# Patient Record
Sex: FEMALE | Race: BLACK OR AFRICAN AMERICAN | NOT HISPANIC OR LATINO | URBAN - METROPOLITAN AREA
[De-identification: names, ages, dates, MRNs, and addresses within clinical notes are randomized per-mention and may not be internally consistent; named-entity substitution may affect disease eponyms.]

---

## 2017-08-04 ENCOUNTER — INPATIENT (INPATIENT)
Facility: HOSPITAL | Age: 82
LOS: 11 days | Discharge: NURSING, REHAB OR LONG-TERM CARE | DRG: 641 | End: 2017-08-16
Attending: INTERNAL MEDICINE | Admitting: HOSPITALIST
Payer: MEDICARE

## 2017-08-04 VITALS
WEIGHT: 130.07 LBS | OXYGEN SATURATION: 97 % | SYSTOLIC BLOOD PRESSURE: 210 MMHG | HEART RATE: 92 BPM | TEMPERATURE: 98 F | RESPIRATION RATE: 20 BRPM | DIASTOLIC BLOOD PRESSURE: 98 MMHG

## 2017-08-04 PROBLEM — Z00.00 ENCOUNTER FOR PREVENTIVE HEALTH EXAMINATION: Status: ACTIVE | Noted: 2017-08-04

## 2017-08-04 LAB
ANION GAP SERPL CALC-SCNC: 19 MMOL/L — HIGH (ref 5–17)
APPEARANCE UR: CLEAR — SIGNIFICANT CHANGE UP
APTT BLD: 28.9 SEC — SIGNIFICANT CHANGE UP (ref 27.5–37.4)
BACTERIA # UR AUTO: ABNORMAL
BILIRUB UR-MCNC: ABNORMAL
BUN SERPL-MCNC: 19 MG/DL — SIGNIFICANT CHANGE UP (ref 8–20)
CALCIUM SERPL-MCNC: 10.6 MG/DL — HIGH (ref 8.6–10.2)
CHLORIDE SERPL-SCNC: 99 MMOL/L — SIGNIFICANT CHANGE UP (ref 98–107)
CK SERPL-CCNC: 181 U/L — HIGH (ref 25–170)
CO2 SERPL-SCNC: 26 MMOL/L — SIGNIFICANT CHANGE UP (ref 22–29)
COLOR SPEC: ABNORMAL
COMMENT - URINE: SIGNIFICANT CHANGE UP
CREAT SERPL-MCNC: 0.74 MG/DL — SIGNIFICANT CHANGE UP (ref 0.5–1.3)
DIFF PNL FLD: ABNORMAL
EPI CELLS # UR: SIGNIFICANT CHANGE UP
GLUCOSE SERPL-MCNC: 171 MG/DL — HIGH (ref 70–115)
GLUCOSE UR QL: NEGATIVE MG/DL — SIGNIFICANT CHANGE UP
HCT VFR BLD CALC: 52.1 % — HIGH (ref 37–47)
HGB BLD-MCNC: 17.3 G/DL — HIGH (ref 12–16)
HYALINE CASTS # UR AUTO: 3 /LPF — SIGNIFICANT CHANGE UP (ref 0–7)
INR BLD: 1.05 RATIO — SIGNIFICANT CHANGE UP (ref 0.88–1.16)
KETONES UR-MCNC: ABNORMAL
LEUKOCYTE ESTERASE UR-ACNC: ABNORMAL
MCHC RBC-ENTMCNC: 31.3 PG — HIGH (ref 27–31)
MCHC RBC-ENTMCNC: 33.2 G/DL — SIGNIFICANT CHANGE UP (ref 32–36)
MCV RBC AUTO: 94.4 FL — SIGNIFICANT CHANGE UP (ref 81–99)
NITRITE UR-MCNC: NEGATIVE — SIGNIFICANT CHANGE UP
PH UR: 6 — SIGNIFICANT CHANGE UP (ref 5–8)
PLATELET # BLD AUTO: 119 K/UL — LOW (ref 150–400)
POTASSIUM SERPL-MCNC: 3.4 MMOL/L — LOW (ref 3.5–5.3)
POTASSIUM SERPL-SCNC: 3.4 MMOL/L — LOW (ref 3.5–5.3)
PROT UR-MCNC: 500 MG/DL
PROTHROM AB SERPL-ACNC: 11.6 SEC — SIGNIFICANT CHANGE UP (ref 9.8–12.7)
RBC # BLD: 5.52 M/UL — HIGH (ref 4.4–5.2)
RBC # FLD: 14.9 % — SIGNIFICANT CHANGE UP (ref 11–15.6)
RBC CASTS # UR COMP ASSIST: ABNORMAL /HPF (ref 0–4)
SODIUM SERPL-SCNC: 144 MMOL/L — SIGNIFICANT CHANGE UP (ref 135–145)
SP GR SPEC: 1.02 — SIGNIFICANT CHANGE UP (ref 1.01–1.02)
TROPONIN T SERPL-MCNC: 0.04 NG/ML — SIGNIFICANT CHANGE UP (ref 0–0.06)
UROBILINOGEN FLD QL: 8 MG/DL
WBC # BLD: 4.8 K/UL — SIGNIFICANT CHANGE UP (ref 4.8–10.8)
WBC # FLD AUTO: 4.8 K/UL — SIGNIFICANT CHANGE UP (ref 4.8–10.8)
WBC UR QL: SIGNIFICANT CHANGE UP

## 2017-08-04 PROCEDURE — 99223 1ST HOSP IP/OBS HIGH 75: CPT

## 2017-08-04 PROCEDURE — 99285 EMERGENCY DEPT VISIT HI MDM: CPT

## 2017-08-04 PROCEDURE — 70450 CT HEAD/BRAIN W/O DYE: CPT | Mod: 26

## 2017-08-04 RX ORDER — SODIUM CHLORIDE 9 MG/ML
3 INJECTION INTRAMUSCULAR; INTRAVENOUS; SUBCUTANEOUS ONCE
Qty: 0 | Refills: 0 | Status: COMPLETED | OUTPATIENT
Start: 2017-08-04 | End: 2017-08-04

## 2017-08-04 RX ADMIN — SODIUM CHLORIDE 3 MILLILITER(S): 9 INJECTION INTRAMUSCULAR; INTRAVENOUS; SUBCUTANEOUS at 15:26

## 2017-08-04 NOTE — H&P ADULT - HISTORY OF PRESENT ILLNESS
H&P obtained from chart as pt refused interview and physical examination. She is currently alone. Per chart she is a 89 y/o female who was sent in by EMS as a friend indicated that she did not leave her room since Tuesday. Pt rents a room in a house and was not seen walking for one week. On arrival pt c/o severe pain worsen with movement, location unknown. I’m unable to obtain more information at this time. Currently, Pt appears clinically dehydrated, frail, and is resting comfortably without distress.

## 2017-08-04 NOTE — ED ADULT NURSE REASSESSMENT NOTE - NS ED NURSE REASSESS COMMENT FT1
Report received from off going RN, charting as noted. Patient alert to self & place. Stated is in a "little" pain secondary to arthritis. Respirations even & unlabored, denies any numbness or tingling. Cardiac monitor in place, NSR. IV site C/D/I, patent, negative s/s phlebitis or infiltration. Will continue to monitor. Patient found to have significant amount of cash and phone on hand. Placed in security envelope and vouchered.

## 2017-08-04 NOTE — H&P ADULT - ASSESSMENT
89 y/o poor historian female, currently being admitted with failure to thrive and mild rhabdomyolysis secondary to dehydration. Please re-evaluate and complete physical examination if pt allows. Unable to complete medication reconciliation as well. Significant proteinuria noted – unclear etiology  Admit to medical unit; NPO until bedside dysphagia screen is completed; IV hydration; Fall / aspiration precautions, bedrest; f/u repeat cbc, cmp, and repeat UA; started Rocephin; DVT prophylaxis.   please contact - primary care physician for patient history.

## 2017-08-04 NOTE — ED PROVIDER NOTE - CONSTITUTIONAL, MLM
normal... ILL appearing, POORLY nourished, awake, alert, oriented to person, place, time/situation and in MILD distress.

## 2017-08-04 NOTE — H&P ADULT - NSHPPHYSICALEXAM_GEN_ALL_CORE
Vital Signs Last 24 Hrs  T(C): 36.4 (04 Aug 2017 19:40), Max: 36.4 (04 Aug 2017 14:35)  T(F): 97.6 (04 Aug 2017 19:40), Max: 97.6 (04 Aug 2017 14:35)  HR: 91 (04 Aug 2017 19:40) (89 - 92)  BP: 122/86 (04 Aug 2017 19:40) (122/86 - 210/98)  RR: 16 (04 Aug 2017 19:40) (16 - 20)  SpO2: 99% (04 Aug 2017 19:40) (97% - 99%)    unable to examine as patient refused.

## 2017-08-04 NOTE — ED ADULT NURSE NOTE - OBJECTIVE STATEMENT
pt presents to ED for evaluation of increased generalized weakness. pt poor historian, pt poorly kempt. pt awake and alert, x 1. pt unaware of time and place, breathing si even and unlabored. afebrile. pt c/o chronic joint pain with movement, abd soft nt nd +bs denies n/v./d, skin w.d.i, pt placed on cardiac monitor NSR ntoed with rate of 98. will continue to montis and reassess

## 2017-08-04 NOTE — ED PROVIDER NOTE - OBJECTIVE STATEMENT
PATIENT SENT TO ER FROM HOME AFTER NOT WALKING FOR A WEEK AS PER [facility]. PT RENTS A ROOM IN A  HOUSE. THE [facility] CALLED 911 B/C THE PATIENT HAS OT BEEN OOB IN ONE WEEK. PT C/O SEVERE PAIN FROM HER ARTHRITIS. PAIN WORSE WITHMOVEMENT. DENIES FEVER, COUGH, BOWEL OR URINARY ISSUES.

## 2017-08-04 NOTE — ED PROVIDER NOTE - MEDICAL DECISION MAKING DETAILS
PT WITH WEAKNESS AND SEVERE ARTHRITIS. IF UNABLE TO AMBULATE WILL BE UNABLE TO SEND HOME. LAB EVAUATION NOT SPECIFIC OTHER THAN SOME RBCS IN THE URINE BUT NOT ENOUGH TO EXPLAIN CONTRACTURES AND WEAKNESS. LIKELY SEVERELY DECONDITIONED PT WITH WEAKNESS AND SEVERE ARTHRITIS. IF UNABLE TO AMBULATE WILL BE UNABLE TO SEND HOME. LAB EVAUATION NOT SPECIFIC OTHER THAN SOME RBCS IN THE URINE BUT NOT ENOUGH TO EXPLAIN CONTRACTURES AND WEAKNESS. LIKELY SEVERELY DECONDITIONED  UNABLE TO STRAIGHTEN LEGS. LIKELY GREATER THAN A WEEK IN BED. CANNOT WALK. CANNOT GO HOME. NO FAMILY HERE. ONLY [facility] CAME AFTER PT ARRIVED TO ER. ADMIT

## 2017-08-04 NOTE — ED ADULT NURSE REASSESSMENT NOTE - NS ED NURSE REASSESS COMMENT FT1
pt resting comfortably in cart. breathing si even and unlabored. pt awaiting dispo. dispo pending ct scan resutls. will continue to monitor and reassess.

## 2017-08-04 NOTE — ED ADULT TRIAGE NOTE - CHIEF COMPLAINT QUOTE
BIBA, patient is awake and disoriented, sent from home, as per EMS, a rented room, for eval of weakness, patient was last seen near baseline on Tuesday,  falls, patient is a poor historian, no family or friend present on arrival

## 2017-08-05 DIAGNOSIS — R62.7 ADULT FAILURE TO THRIVE: ICD-10-CM

## 2017-08-05 DIAGNOSIS — I10 ESSENTIAL (PRIMARY) HYPERTENSION: ICD-10-CM

## 2017-08-05 DIAGNOSIS — R53.1 WEAKNESS: ICD-10-CM

## 2017-08-05 DIAGNOSIS — E86.0 DEHYDRATION: ICD-10-CM

## 2017-08-05 LAB
ALBUMIN SERPL ELPH-MCNC: 3.7 G/DL — SIGNIFICANT CHANGE UP (ref 3.3–5.2)
ALP SERPL-CCNC: 79 U/L — SIGNIFICANT CHANGE UP (ref 40–120)
ALT FLD-CCNC: 10 U/L — SIGNIFICANT CHANGE UP
ANION GAP SERPL CALC-SCNC: 18 MMOL/L — HIGH (ref 5–17)
AST SERPL-CCNC: 19 U/L — SIGNIFICANT CHANGE UP
BASOPHILS # BLD AUTO: 0 K/UL — SIGNIFICANT CHANGE UP (ref 0–0.2)
BASOPHILS NFR BLD AUTO: 0.2 % — SIGNIFICANT CHANGE UP (ref 0–2)
BILIRUB SERPL-MCNC: 1.3 MG/DL — SIGNIFICANT CHANGE UP (ref 0.4–2)
BUN SERPL-MCNC: 21 MG/DL — HIGH (ref 8–20)
CALCIUM SERPL-MCNC: 10.1 MG/DL — SIGNIFICANT CHANGE UP (ref 8.6–10.2)
CHLORIDE SERPL-SCNC: 105 MMOL/L — SIGNIFICANT CHANGE UP (ref 98–107)
CO2 SERPL-SCNC: 24 MMOL/L — SIGNIFICANT CHANGE UP (ref 22–29)
CREAT SERPL-MCNC: 0.78 MG/DL — SIGNIFICANT CHANGE UP (ref 0.5–1.3)
GLUCOSE SERPL-MCNC: 137 MG/DL — HIGH (ref 70–115)
HCT VFR BLD CALC: 48 % — HIGH (ref 37–47)
HGB BLD-MCNC: 16.3 G/DL — HIGH (ref 12–16)
LYMPHOCYTES # BLD AUTO: 1.4 K/UL — SIGNIFICANT CHANGE UP (ref 1–4.8)
LYMPHOCYTES # BLD AUTO: 24 % — SIGNIFICANT CHANGE UP (ref 20–55)
MCHC RBC-ENTMCNC: 32 PG — HIGH (ref 27–31)
MCHC RBC-ENTMCNC: 34 G/DL — SIGNIFICANT CHANGE UP (ref 32–36)
MCV RBC AUTO: 94.3 FL — SIGNIFICANT CHANGE UP (ref 81–99)
MONOCYTES # BLD AUTO: 0.6 K/UL — SIGNIFICANT CHANGE UP (ref 0–0.8)
MONOCYTES NFR BLD AUTO: 9.6 % — SIGNIFICANT CHANGE UP (ref 3–10)
NEUTROPHILS # BLD AUTO: 4 K/UL — SIGNIFICANT CHANGE UP (ref 1.8–8)
NEUTROPHILS NFR BLD AUTO: 65.9 % — SIGNIFICANT CHANGE UP (ref 37–73)
PLATELET # BLD AUTO: 104 K/UL — LOW (ref 150–400)
POTASSIUM SERPL-MCNC: 3.3 MMOL/L — LOW (ref 3.5–5.3)
POTASSIUM SERPL-SCNC: 3.3 MMOL/L — LOW (ref 3.5–5.3)
PROT SERPL-MCNC: 7 G/DL — SIGNIFICANT CHANGE UP (ref 6.6–8.7)
RBC # BLD: 5.09 M/UL — SIGNIFICANT CHANGE UP (ref 4.4–5.2)
RBC # FLD: 15 % — SIGNIFICANT CHANGE UP (ref 11–15.6)
SODIUM SERPL-SCNC: 147 MMOL/L — HIGH (ref 135–145)
WBC # BLD: 6 K/UL — SIGNIFICANT CHANGE UP (ref 4.8–10.8)
WBC # FLD AUTO: 6 K/UL — SIGNIFICANT CHANGE UP (ref 4.8–10.8)

## 2017-08-05 PROCEDURE — 99233 SBSQ HOSP IP/OBS HIGH 50: CPT

## 2017-08-05 RX ORDER — CEFTRIAXONE 500 MG/1
1 INJECTION, POWDER, FOR SOLUTION INTRAMUSCULAR; INTRAVENOUS ONCE
Qty: 0 | Refills: 0 | Status: COMPLETED | OUTPATIENT
Start: 2017-08-05 | End: 2017-08-05

## 2017-08-05 RX ORDER — HYDRALAZINE HCL 50 MG
10 TABLET ORAL ONCE
Qty: 0 | Refills: 0 | Status: COMPLETED | OUTPATIENT
Start: 2017-08-05 | End: 2017-08-05

## 2017-08-05 RX ORDER — HYDRALAZINE HCL 50 MG
10 TABLET ORAL
Qty: 0 | Refills: 0 | Status: DISCONTINUED | OUTPATIENT
Start: 2017-08-05 | End: 2017-08-16

## 2017-08-05 RX ORDER — POTASSIUM CHLORIDE 20 MEQ
40 PACKET (EA) ORAL EVERY 4 HOURS
Qty: 0 | Refills: 0 | Status: DISCONTINUED | OUTPATIENT
Start: 2017-08-05 | End: 2017-08-05

## 2017-08-05 RX ORDER — POTASSIUM CHLORIDE 20 MEQ
10 PACKET (EA) ORAL
Qty: 0 | Refills: 0 | Status: COMPLETED | OUTPATIENT
Start: 2017-08-05 | End: 2017-08-05

## 2017-08-05 RX ORDER — SODIUM CHLORIDE 9 MG/ML
1000 INJECTION INTRAMUSCULAR; INTRAVENOUS; SUBCUTANEOUS ONCE
Qty: 0 | Refills: 0 | Status: COMPLETED | OUTPATIENT
Start: 2017-08-05 | End: 2017-08-05

## 2017-08-05 RX ORDER — SODIUM CHLORIDE 9 MG/ML
1000 INJECTION INTRAMUSCULAR; INTRAVENOUS; SUBCUTANEOUS
Qty: 0 | Refills: 0 | Status: DISCONTINUED | OUTPATIENT
Start: 2017-08-05 | End: 2017-08-05

## 2017-08-05 RX ORDER — SODIUM CHLORIDE 9 MG/ML
1000 INJECTION, SOLUTION INTRAVENOUS
Qty: 0 | Refills: 0 | Status: DISCONTINUED | OUTPATIENT
Start: 2017-08-05 | End: 2017-08-06

## 2017-08-05 RX ORDER — CEFTRIAXONE 500 MG/1
1 INJECTION, POWDER, FOR SOLUTION INTRAMUSCULAR; INTRAVENOUS EVERY 24 HOURS
Qty: 0 | Refills: 0 | Status: DISCONTINUED | OUTPATIENT
Start: 2017-08-06 | End: 2017-08-08

## 2017-08-05 RX ORDER — ENOXAPARIN SODIUM 100 MG/ML
30 INJECTION SUBCUTANEOUS EVERY 24 HOURS
Qty: 0 | Refills: 0 | Status: DISCONTINUED | OUTPATIENT
Start: 2017-08-05 | End: 2017-08-16

## 2017-08-05 RX ORDER — CEFTRIAXONE 500 MG/1
INJECTION, POWDER, FOR SOLUTION INTRAMUSCULAR; INTRAVENOUS
Qty: 0 | Refills: 0 | Status: DISCONTINUED | OUTPATIENT
Start: 2017-08-05 | End: 2017-08-08

## 2017-08-05 RX ORDER — POTASSIUM CHLORIDE 20 MEQ
10 PACKET (EA) ORAL ONCE
Qty: 0 | Refills: 0 | Status: COMPLETED | OUTPATIENT
Start: 2017-08-05 | End: 2017-08-05

## 2017-08-05 RX ORDER — ACETAMINOPHEN 500 MG
1000 TABLET ORAL ONCE
Qty: 0 | Refills: 0 | Status: COMPLETED | OUTPATIENT
Start: 2017-08-05 | End: 2017-08-05

## 2017-08-05 RX ADMIN — SODIUM CHLORIDE 80 MILLILITER(S): 9 INJECTION INTRAMUSCULAR; INTRAVENOUS; SUBCUTANEOUS at 05:14

## 2017-08-05 RX ADMIN — Medication 100 MILLIEQUIVALENT(S): at 13:56

## 2017-08-05 RX ADMIN — CEFTRIAXONE 100 GRAM(S): 500 INJECTION, POWDER, FOR SOLUTION INTRAMUSCULAR; INTRAVENOUS at 00:59

## 2017-08-05 RX ADMIN — Medication 40 MILLIEQUIVALENT(S): at 10:44

## 2017-08-05 RX ADMIN — Medication 100 MILLIEQUIVALENT(S): at 12:04

## 2017-08-05 RX ADMIN — Medication 100 MILLIEQUIVALENT(S): at 00:59

## 2017-08-05 RX ADMIN — Medication 10 MILLIGRAM(S): at 03:55

## 2017-08-05 RX ADMIN — SODIUM CHLORIDE 100 MILLILITER(S): 9 INJECTION, SOLUTION INTRAVENOUS at 10:43

## 2017-08-05 RX ADMIN — Medication 10 MILLIGRAM(S): at 18:57

## 2017-08-05 RX ADMIN — Medication 1000 MILLIGRAM(S): at 23:43

## 2017-08-05 RX ADMIN — Medication 400 MILLIGRAM(S): at 23:13

## 2017-08-05 RX ADMIN — ENOXAPARIN SODIUM 30 MILLIGRAM(S): 100 INJECTION SUBCUTANEOUS at 10:44

## 2017-08-05 RX ADMIN — Medication 100 MILLIEQUIVALENT(S): at 15:30

## 2017-08-05 RX ADMIN — SODIUM CHLORIDE 250 MILLILITER(S): 9 INJECTION INTRAMUSCULAR; INTRAVENOUS; SUBCUTANEOUS at 01:00

## 2017-08-05 NOTE — ED ADULT NURSE REASSESSMENT NOTE - NS ED NURSE REASSESS COMMENT FT1
pt care assumed at 0040, no apparent distress noted at this time, charting as noted. Pt received alert and oriented to person. Pt is confused. Iv medication was started as per orders. HR is regular, lung sounds are clear b/l, abd is soft and nontender with positive bowel sounds in all four quadrants, skin is warm, dry and appropriate for age and race. plan of care explained, will continue to monitor.

## 2017-08-05 NOTE — PROGRESS NOTE ADULT - SUBJECTIVE AND OBJECTIVE BOX
is a 89 y/o female presented w/failure to thrive and dehydration. Today, she's opening her eyes and communicating, but still appears exhausted. Her CT head is negative for a stroke, I'm encourging her to eat a dysphagia diet for now. I've placed a consult for speech. There appear to be no overt signs of infection.       Summary:   REVIEW OF SYSTEMS    General:	"I'm okay, just tired."    Skin/Breast:  	  Ophthalmologic:  	  ENMT:	    Respiratory and Thorax:  	  Cardiovascular:	    Gastrointestinal:	    Genitourinary:	    Musculoskeletal:	    Neurological:	    Psychiatric:	    Hematology/Lymphatics:	    Endocrine:	    Allergic/Immunologic:	  Vital Signs Last 24 Hrs  T(C): 37.1 (05 Aug 2017 08:30), Max: 37.1 (05 Aug 2017 08:30)  T(F): 98.7 (05 Aug 2017 08:30), Max: 98.7 (05 Aug 2017 08:30)  HR: 84 (05 Aug 2017 08:30) (84 - 92)  BP: 158/84 (05 Aug 2017 08:30) (122/86 - 224/102)  BP(mean): --  RR: 20 (05 Aug 2017 08:30) (16 - 20)  SpO2: 96% (05 Aug 2017 08:30) (96% - 99%)  PHYSICAL EXAM:  GEN: very frail, elderly, mild distress, resting her head on the side of the bed  HEENT: dry MM  CVS: S1S2 HR=70s  PULM: CTABL, good breath sounds  ABD: soft, nontender, no rebound, no guarding  EXTREM: no edema  NEURO: lethargic  PSYCH: unable to evaluate  SKIN: dry                          16.3   6.0   )-----------( 104      ( 05 Aug 2017 05:39 )             48.0     08-05    147<H>  |  105  |  21.0<H>  ----------------------------<  137<H>  3.3<L>   |  24.0  |  0.78    Ca    10.1      05 Aug 2017 05:39    TPro  7.0  /  Alb  3.7  /  TBili  1.3  /  DBili  x   /  AST  19  /  ALT  10  /  AlkPhos  79  08-05    LIVER FUNCTIONS - ( 05 Aug 2017 05:39 )  Alb: 3.7 g/dL / Pro: 7.0 g/dL / ALK PHOS: 79 U/L / ALT: 10 U/L / AST: 19 U/L / GGT: x           PT/INR - ( 04 Aug 2017 15:43 )   PT: 11.6 sec;   INR: 1.05 ratio         PTT - ( 04 Aug 2017 15:43 )  PTT:28.9 sec    Radiology:     MEDICATIONS  (STANDING):  enoxaparin Injectable 30 milliGRAM(s) SubCutaneous every 24 hours  cefTRIAXone   IVPB   IV Intermittent   dextrose 5% + lactated ringers. 1000 milliLiter(s) (100 mL/Hr) IV Continuous <Continuous>  potassium chloride  10 mEq/100 mL IVPB 10 milliEquivalent(s) IV Intermittent every 1 hour    MEDICATIONS  (PRN):  hydrALAZINE Injectable 10 milliGRAM(s) IV Push every 3 hours PRN SBP >160

## 2017-08-05 NOTE — ED ADULT NURSE REASSESSMENT NOTE - NS ED NURSE REASSESS COMMENT FT1
MD Celeste advised of pt blood pressure. awaiting further orders. Pt denies any complaints at this time. will continue to monitor.

## 2017-08-06 LAB
ANION GAP SERPL CALC-SCNC: 12 MMOL/L — SIGNIFICANT CHANGE UP (ref 5–17)
BUN SERPL-MCNC: 19 MG/DL — SIGNIFICANT CHANGE UP (ref 8–20)
CALCIUM SERPL-MCNC: 9.8 MG/DL — SIGNIFICANT CHANGE UP (ref 8.6–10.2)
CHLORIDE SERPL-SCNC: 106 MMOL/L — SIGNIFICANT CHANGE UP (ref 98–107)
CO2 SERPL-SCNC: 27 MMOL/L — SIGNIFICANT CHANGE UP (ref 22–29)
CREAT SERPL-MCNC: 0.72 MG/DL — SIGNIFICANT CHANGE UP (ref 0.5–1.3)
GLUCOSE SERPL-MCNC: 172 MG/DL — HIGH (ref 70–115)
HCT VFR BLD CALC: 42.7 % — SIGNIFICANT CHANGE UP (ref 37–47)
HGB BLD-MCNC: 14.1 G/DL — SIGNIFICANT CHANGE UP (ref 12–16)
MAGNESIUM SERPL-MCNC: 1.9 MG/DL — SIGNIFICANT CHANGE UP (ref 1.6–2.6)
MCHC RBC-ENTMCNC: 31.6 PG — HIGH (ref 27–31)
MCHC RBC-ENTMCNC: 33 G/DL — SIGNIFICANT CHANGE UP (ref 32–36)
MCV RBC AUTO: 95.7 FL — SIGNIFICANT CHANGE UP (ref 81–99)
PHOSPHATE SERPL-MCNC: 1.5 MG/DL — LOW (ref 2.4–4.7)
PLATELET # BLD AUTO: 100 K/UL — LOW (ref 150–400)
POTASSIUM SERPL-MCNC: 3.3 MMOL/L — LOW (ref 3.5–5.3)
POTASSIUM SERPL-SCNC: 3.3 MMOL/L — LOW (ref 3.5–5.3)
RBC # BLD: 4.46 M/UL — SIGNIFICANT CHANGE UP (ref 4.4–5.2)
RBC # FLD: 15.1 % — SIGNIFICANT CHANGE UP (ref 11–15.6)
SODIUM SERPL-SCNC: 145 MMOL/L — SIGNIFICANT CHANGE UP (ref 135–145)
WBC # BLD: 4.5 K/UL — LOW (ref 4.8–10.8)
WBC # FLD AUTO: 4.5 K/UL — LOW (ref 4.8–10.8)

## 2017-08-06 PROCEDURE — 99233 SBSQ HOSP IP/OBS HIGH 50: CPT

## 2017-08-06 RX ORDER — PANTOPRAZOLE SODIUM 20 MG/1
40 TABLET, DELAYED RELEASE ORAL DAILY
Qty: 0 | Refills: 0 | Status: DISCONTINUED | OUTPATIENT
Start: 2017-08-06 | End: 2017-08-08

## 2017-08-06 RX ORDER — POTASSIUM CHLORIDE 20 MEQ
10 PACKET (EA) ORAL
Qty: 0 | Refills: 0 | Status: COMPLETED | OUTPATIENT
Start: 2017-08-06 | End: 2017-08-06

## 2017-08-06 RX ORDER — ACETAMINOPHEN 500 MG
1000 TABLET ORAL ONCE
Qty: 0 | Refills: 0 | Status: COMPLETED | OUTPATIENT
Start: 2017-08-06 | End: 2017-08-06

## 2017-08-06 RX ORDER — CELECOXIB 200 MG/1
200 CAPSULE ORAL DAILY
Qty: 0 | Refills: 0 | Status: DISCONTINUED | OUTPATIENT
Start: 2017-08-06 | End: 2017-08-16

## 2017-08-06 RX ORDER — MAGNESIUM SULFATE 500 MG/ML
1 VIAL (ML) INJECTION ONCE
Qty: 0 | Refills: 0 | Status: COMPLETED | OUTPATIENT
Start: 2017-08-06 | End: 2017-08-06

## 2017-08-06 RX ORDER — PANTOPRAZOLE SODIUM 20 MG/1
40 TABLET, DELAYED RELEASE ORAL
Qty: 0 | Refills: 0 | Status: DISCONTINUED | OUTPATIENT
Start: 2017-08-06 | End: 2017-08-06

## 2017-08-06 RX ORDER — SODIUM CHLORIDE 9 MG/ML
1000 INJECTION, SOLUTION INTRAVENOUS
Qty: 0 | Refills: 0 | Status: DISCONTINUED | OUTPATIENT
Start: 2017-08-06 | End: 2017-08-08

## 2017-08-06 RX ORDER — AMLODIPINE BESYLATE 2.5 MG/1
5 TABLET ORAL DAILY
Qty: 0 | Refills: 0 | Status: DISCONTINUED | OUTPATIENT
Start: 2017-08-06 | End: 2017-08-06

## 2017-08-06 RX ADMIN — PANTOPRAZOLE SODIUM 40 MILLIGRAM(S): 20 TABLET, DELAYED RELEASE ORAL at 13:01

## 2017-08-06 RX ADMIN — CEFTRIAXONE 100 GRAM(S): 500 INJECTION, POWDER, FOR SOLUTION INTRAMUSCULAR; INTRAVENOUS at 23:47

## 2017-08-06 RX ADMIN — Medication 100 MILLIEQUIVALENT(S): at 16:00

## 2017-08-06 RX ADMIN — SODIUM CHLORIDE 75 MILLILITER(S): 9 INJECTION, SOLUTION INTRAVENOUS at 17:40

## 2017-08-06 RX ADMIN — Medication 400 MILLIGRAM(S): at 17:40

## 2017-08-06 RX ADMIN — CEFTRIAXONE 100 GRAM(S): 500 INJECTION, POWDER, FOR SOLUTION INTRAMUSCULAR; INTRAVENOUS at 01:28

## 2017-08-06 RX ADMIN — Medication 100 GRAM(S): at 14:13

## 2017-08-06 RX ADMIN — ENOXAPARIN SODIUM 30 MILLIGRAM(S): 100 INJECTION SUBCUTANEOUS at 13:01

## 2017-08-06 RX ADMIN — SODIUM CHLORIDE 100 MILLILITER(S): 9 INJECTION, SOLUTION INTRAVENOUS at 13:01

## 2017-08-06 RX ADMIN — Medication 400 MILLIGRAM(S): at 23:06

## 2017-08-06 RX ADMIN — Medication 10 MILLIGRAM(S): at 17:41

## 2017-08-06 RX ADMIN — Medication 100 MILLIEQUIVALENT(S): at 20:27

## 2017-08-06 RX ADMIN — Medication 100 MILLIEQUIVALENT(S): at 17:41

## 2017-08-06 NOTE — SWALLOW BEDSIDE ASSESSMENT ADULT - PHARYNGEAL PHASE
Multiple swallows/Decreased laryngeal elevation/Complaints of pharyngeal stasis Complaints of pharyngeal stasis/Multiple swallows

## 2017-08-06 NOTE — SWALLOW BEDSIDE ASSESSMENT ADULT - SWALLOW EVAL: DIAGNOSIS
Moderate oral dysphagia. Cannot r/o pharyngeal dysphagia. Pt c/o being unable to swallow, c/o nausea and with frequent belching throughout (per RN, this is baseline). Pt also c/o significant pain with yelling on attempts to reposition in bed. Overall assessment limited 2* limited PO accepted this date.

## 2017-08-06 NOTE — SWALLOW BEDSIDE ASSESSMENT ADULT - SLP GENERAL OBSERVATIONS
Pt received awake in bed, 0x2, rigid and contracted in bed with c/o severe hip pain with attempts with RN Meenakshi to reposition in bed,Pt with frequent belching, reduced cognition, ?[place], +02 via nc, requiring encouragement to participate in evaluations

## 2017-08-06 NOTE — PROGRESS NOTE ADULT - SUBJECTIVE AND OBJECTIVE BOX
is a 89 y/o female presented w/failure to thrive and dehydration. She failed her speech and swallow evaluation and I tried to call her family with regards to peg tube placement. She's not mentating very well, and I'm not sure if she has capacity. I've left messages and placed a social work consult to try to find some of the family members. She appears more comfortable and is mentating today, but her prognosis is guarded.    I considered obtaining an MRI but I need permission and  appears contracted in bed and cannot be positioned to lie down flat and still. She most likely has arthritis and is contracted, possibly from prolonged bed-rest at home. I will need to obtain more information before proceeding with her care.    Summary:   REVIEW OF SYSTEMS    General:	"I'm okay, just tired."    Skin/Breast:  	  Ophthalmologic:  	  ENMT:	    Respiratory and Thorax:  	  Cardiovascular:	    Gastrointestinal:	    Genitourinary:	    Musculoskeletal:	    Neurological:	    Psychiatric:	    Hematology/Lymphatics:	    Endocrine:	    Allergic/Immunologic:	  Vital Signs Last 24 Hrs  135/74, HR=98  PHYSICAL EXAM:  GEN: very frail, elderly, mild distress, resting her head on the side of the bed  HEENT: dry MM  CVS: S1S2 HR=70s  PULM: CTABL, good breath sounds  ABD: soft, nontender, no rebound, no guarding  EXTREM: no edema, +contracted, cannot extend legs, +pain in the joints  NEURO: lethargic  PSYCH: unable to evaluate  SKIN: dry                          16.3   6.0   )-----------( 104      ( 05 Aug 2017 05:39 )             48.0     08-05    147<H>  |  105  |  21.0<H>  ----------------------------<  137<H>  3.3<L>   |  24.0  |  0.78    Ca    10.1      05 Aug 2017 05:39    TPro  7.0  /  Alb  3.7  /  TBili  1.3  /  DBili  x   /  AST  19  /  ALT  10  /  AlkPhos  79  08-05    LIVER FUNCTIONS - ( 05 Aug 2017 05:39 )  Alb: 3.7 g/dL / Pro: 7.0 g/dL / ALK PHOS: 79 U/L / ALT: 10 U/L / AST: 19 U/L / GGT: x           PT/INR - ( 04 Aug 2017 15:43 )   PT: 11.6 sec;   INR: 1.05 ratio         PTT - ( 04 Aug 2017 15:43 )  PTT:28.9 sec    Radiology:     MEDICATIONS  (STANDING):  enoxaparin Injectable 30 milliGRAM(s) SubCutaneous every 24 hours  cefTRIAXone   IVPB   IV Intermittent   dextrose 5% + lactated ringers. 1000 milliLiter(s) (100 mL/Hr) IV Continuous <Continuous>  potassium chloride  10 mEq/100 mL IVPB 10 milliEquivalent(s) IV Intermittent every 1 hour    MEDICATIONS  (PRN):  hydrALAZINE Injectable 10 milliGRAM(s) IV Push every 3 hours PRN SBP >160

## 2017-08-06 NOTE — SWALLOW BEDSIDE ASSESSMENT ADULT - NS SPL SWALLOW CLINIC TRIAL FT
+belching after PO trials, but also at baseline. Pt c/o "I can't swallow it."Pt also with c/o of nausea, significant pain precluding full participation in assessment

## 2017-08-06 NOTE — SWALLOW BEDSIDE ASSESSMENT ADULT - COMMENTS
+belching after PO trials, but also at baseline. Pt c/o "I can't swallow it." Pt also with c/o of nausea, significant pain precluding full participation in assessment

## 2017-08-06 NOTE — SWALLOW BEDSIDE ASSESSMENT ADULT - ASR SWALLOW ASPIRATION MONITOR
oral hygiene/gurgly voice/throat clearing/cough/change of breathing pattern/fever/pneumonia/position upright (89Y)

## 2017-08-07 LAB
ANION GAP SERPL CALC-SCNC: 6 MMOL/L — SIGNIFICANT CHANGE UP (ref 5–17)
BUN SERPL-MCNC: 12 MG/DL — SIGNIFICANT CHANGE UP (ref 8–20)
CALCIUM SERPL-MCNC: 9.4 MG/DL — SIGNIFICANT CHANGE UP (ref 8.6–10.2)
CHLORIDE SERPL-SCNC: 108 MMOL/L — HIGH (ref 98–107)
CO2 SERPL-SCNC: 30 MMOL/L — HIGH (ref 22–29)
CREAT SERPL-MCNC: 0.65 MG/DL — SIGNIFICANT CHANGE UP (ref 0.5–1.3)
GLUCOSE SERPL-MCNC: 131 MG/DL — HIGH (ref 70–115)
HCT VFR BLD CALC: 38.5 % — SIGNIFICANT CHANGE UP (ref 37–47)
HGB BLD-MCNC: 12.6 G/DL — SIGNIFICANT CHANGE UP (ref 12–16)
MAGNESIUM SERPL-MCNC: 1.9 MG/DL — SIGNIFICANT CHANGE UP (ref 1.6–2.6)
MCHC RBC-ENTMCNC: 31.2 PG — HIGH (ref 27–31)
MCHC RBC-ENTMCNC: 32.7 G/DL — SIGNIFICANT CHANGE UP (ref 32–36)
MCV RBC AUTO: 95.3 FL — SIGNIFICANT CHANGE UP (ref 81–99)
PHOSPHATE SERPL-MCNC: 1.6 MG/DL — LOW (ref 2.4–4.7)
PLATELET # BLD AUTO: 101 K/UL — LOW (ref 150–400)
POTASSIUM SERPL-MCNC: 3.4 MMOL/L — LOW (ref 3.5–5.3)
POTASSIUM SERPL-SCNC: 3.4 MMOL/L — LOW (ref 3.5–5.3)
RBC # BLD: 4.04 M/UL — LOW (ref 4.4–5.2)
RBC # FLD: 15.3 % — SIGNIFICANT CHANGE UP (ref 11–15.6)
SODIUM SERPL-SCNC: 144 MMOL/L — SIGNIFICANT CHANGE UP (ref 135–145)
TSH SERPL-MCNC: 2.42 UIU/ML — SIGNIFICANT CHANGE UP (ref 0.27–4.2)
WBC # BLD: 3.9 K/UL — LOW (ref 4.8–10.8)
WBC # FLD AUTO: 3.9 K/UL — LOW (ref 4.8–10.8)

## 2017-08-07 PROCEDURE — 99233 SBSQ HOSP IP/OBS HIGH 50: CPT

## 2017-08-07 PROCEDURE — 99222 1ST HOSP IP/OBS MODERATE 55: CPT

## 2017-08-07 RX ORDER — POTASSIUM CHLORIDE 20 MEQ
10 PACKET (EA) ORAL
Qty: 0 | Refills: 0 | Status: COMPLETED | OUTPATIENT
Start: 2017-08-07 | End: 2017-08-07

## 2017-08-07 RX ORDER — ACETAMINOPHEN 500 MG
1000 TABLET ORAL ONCE
Qty: 0 | Refills: 0 | Status: COMPLETED | OUTPATIENT
Start: 2017-08-07 | End: 2017-08-07

## 2017-08-07 RX ORDER — LABETALOL HCL 100 MG
10 TABLET ORAL ONCE
Qty: 0 | Refills: 0 | Status: COMPLETED | OUTPATIENT
Start: 2017-08-07 | End: 2017-08-07

## 2017-08-07 RX ORDER — LIDOCAINE 4 G/100G
1 CREAM TOPICAL DAILY
Qty: 0 | Refills: 0 | Status: DISCONTINUED | OUTPATIENT
Start: 2017-08-07 | End: 2017-08-16

## 2017-08-07 RX ADMIN — Medication 10 MILLIGRAM(S): at 18:23

## 2017-08-07 RX ADMIN — Medication 100 MILLIEQUIVALENT(S): at 17:09

## 2017-08-07 RX ADMIN — Medication 1000 MILLIGRAM(S): at 00:06

## 2017-08-07 RX ADMIN — Medication 1000 MILLIGRAM(S): at 18:56

## 2017-08-07 RX ADMIN — Medication 10 MILLIGRAM(S): at 13:35

## 2017-08-07 RX ADMIN — ENOXAPARIN SODIUM 30 MILLIGRAM(S): 100 INJECTION SUBCUTANEOUS at 12:57

## 2017-08-07 RX ADMIN — LIDOCAINE 1 PATCH: 4 CREAM TOPICAL at 17:08

## 2017-08-07 RX ADMIN — Medication 400 MILLIGRAM(S): at 18:22

## 2017-08-07 RX ADMIN — Medication 100 MILLIEQUIVALENT(S): at 14:44

## 2017-08-07 RX ADMIN — Medication 63.75 MILLIMOLE(S): at 18:25

## 2017-08-07 RX ADMIN — PANTOPRAZOLE SODIUM 40 MILLIGRAM(S): 20 TABLET, DELAYED RELEASE ORAL at 12:57

## 2017-08-07 RX ADMIN — CEFTRIAXONE 100 GRAM(S): 500 INJECTION, POWDER, FOR SOLUTION INTRAMUSCULAR; INTRAVENOUS at 23:57

## 2017-08-07 RX ADMIN — Medication 100 MILLIEQUIVALENT(S): at 12:57

## 2017-08-07 NOTE — CONSULT NOTE ADULT - SUBJECTIVE AND OBJECTIVE BOX
Patient is a 89y old  Female who presents with a chief complaint of BIBA for weakness (04 Aug 2017 23:33)      HPI:   Hx from patient and chart. Pt rents a room. Rod sent pt to ER as she was not seen walking for almost a week.  Complaining of  diffuse back pain and joint pain.  Primary is not sure if pt has capacity. CT head negative for acute changes. Pt is alert, oriented to person, place and year.  Bedside swallow was a limited assessment.  She was noted to have moderate oral dysphgia, but cannot exclude pharygeal dysphagia.  Pt yelled  secondary to back pain when bed was adjusted and then would not swallow. Pt is answering my questions appropriately. States she does not have problem swallowing, but is too weak to feed herself. Feels she can eat with assistance. She does not attempt to eat foods she does not like such as fruits and cereals.   we discussed PEG tube placement and she does want it and does not feel she needs it if someone feeds her.    Primary left a message with family to see if family wants PEG    REVIEW OF SYSTEMS:  Constitutional: No fever, weight loss or fatigue  ENMT:  No difficulty hearing, tinnitus, vertigo; No sinus or throat pain  Respiratory: No cough, wheezing, chills or hemoptysis  Cardiovascular: No chest pain, palpitations, dizziness or leg swelling  Gastrointestinal: No abdominal or epigastric pain. No nausea, vomiting or hematemesis; No diarrhea or constipation. No melena or hematochezia.  Skin: No itching, burning, rashes or lesions   Musculoskeletal: +joint pain  + back or extremity pain    PAST MEDICAL & SURGICAL HISTORY:  No pertinent past medical history  No significant past surgical history      FAMILY HISTORY:  No pertinent family history in first degree relatives      SOCIAL HISTORY:  Smoking Status: [ ] Current, [ ] Former, [ ] Never  Pack Years:  [  ] EtOH- no  [  ] IVDA- no    MEDICATIONS:  MEDICATIONS  (STANDING):  enoxaparin Injectable 30 milliGRAM(s) SubCutaneous every 24 hours  cefTRIAXone   IVPB   IV Intermittent   cefTRIAXone   IVPB 1 Gram(s) IV Intermittent every 24 hours  celecoxib 200 milliGRAM(s) Oral daily  pantoprazole  Injectable 40 milliGRAM(s) IV Push daily  dextrose 5% + lactated ringers. 1000 milliLiter(s) (75 mL/Hr) IV Continuous <Continuous>  sodium phosphate IVPB 15 milliMole(s) IV Intermittent once  potassium chloride  10 mEq/100 mL IVPB 10 milliEquivalent(s) IV Intermittent every 1 hour    MEDICATIONS  (PRN):  hydrALAZINE Injectable 10 milliGRAM(s) IV Push every 3 hours PRN SBP >160      Allergies    Allergy Status Unknown    Intolerances        Vital Signs Last 24 Hrs  T(C): 36.2 (06 Aug 2017 23:06), Max: 36.4 (06 Aug 2017 15:15)  T(F): 97.1 (06 Aug 2017 23:06), Max: 97.5 (06 Aug 2017 15:15)  HR: 69 (06 Aug 2017 23:06) (69 - 82)  BP: 146/70 (06 Aug 2017 23:06) (146/70 - 170/93)  BP(mean): --  RR: 20 (06 Aug 2017 23:06) (18 - 20)  SpO2: 97% (06 Aug 2017 23:06) (97% - 99%)        PHYSICAL EXAM:    General: Well developed;  weak appearing  HEENT: MMM, conjunctiva and sclera clear  H- RRR  L- CTA  Gastrointestinal: Soft, non-tender non-distended; Normal bowel sounds; No rebound or guarding  Extremities: Normal range of motion, No clubbing, cyanosis or edema. Knees are bent? contracted. Pt states she cant move them without assistance ( too weak)  Neurological: Alert and oriented x3  Skin: Warm and dry. No obvious rash      LABS:                        12.6   3.9   )-----------( 101      ( 07 Aug 2017 07:54 )             38.5     07 Aug 2017 07:54    144    |  108    |  12.0   ----------------------------<  131    3.4     |  30.0   |  0.65     Ca    9.4        07 Aug 2017 07:54  Phos  1.6       07 Aug 2017 07:54  Mg     1.9       07 Aug 2017 07:54                RADIOLOGY & ADDITIONAL STUDIES:     < from: CT Head No Cont (08.04.17 @ 19:11) >  IMPRESSION:    No acute intracranial findings.  Mild atrophy and chronic white matter microvascular ischemic changes as   described.   If acute stroke is of clinical concern, MRI with diffusion-weighted   images would be helpful for further characterization.        < end of copied text >

## 2017-08-07 NOTE — CHART NOTE - NSCHARTNOTEFT_GEN_A_CORE
Upon Nutritional Assessment by the Registered Dietitian your patient was determined to meet criteria / has evidence of the following diagnosis/diagnoses:          [ ]  Mild Protein Calorie Malnutrition        [ ]  Moderate Protein Calorie Malnutrition        [ x ] Severe Protein Calorie Malnutrition        [ ] Unspecified Protein Calorie Malnutrition        [ ] Underweight / BMI <19        [ ] Morbid Obesity / BMI > 40      Findings as based on:  •  Comprehensive nutrition assessment and consultation  •  Calorie counts (nutrient intake analysis)  •  Food acceptance and intake status from observations by staff  •  Follow up  •  Patient education  •  Intervention secondary to interdisciplinary rounds  •   concerns      Treatment:    The following diet has been recommended:  When retesting by SLP is done and food consistency can be determined, change regular diet with recommended consistency if medically feasible. If unable to tolerate PO, consider alternate route.       PROVIDER Section:     By signing this assessment you are acknowledging and agree with the diagnosis/diagnoses assigned by the Registered Dietitian    Comments:

## 2017-08-07 NOTE — CONSULT NOTE ADULT - PROBLEM SELECTOR RECOMMENDATION 9
Unclear if pt truly has dysphagia. . Currently getting IV fluids.    Pt does not want feeding tube ( but primary is not sure regarding capacity- consider psych consult). Primary left a message with family to get their opinion.  Would reattempt bedside swallow . Pt feels she can eat if assisted. Unclear what discharge plans are ( ? nursing home).  correct K and phos levels.   get EKG and CXR  May place NGT and give tube feeds temporarily

## 2017-08-07 NOTE — DIETITIAN INITIAL EVALUATION ADULT. - OTHER INFO
Pt seen at bedside. Attempted to conduct interview but pt seemed to be confused. Per charts, pt has not been seen walking for one week and has not left her room since Tuesday. Pt currently on NPO diet as recommended per SLP. Pt keeps requesting to eat food but has failed testing. Still unclear if pt has dysphagia and SLP will retest to evaluate.

## 2017-08-07 NOTE — PROGRESS NOTE ADULT - SUBJECTIVE AND OBJECTIVE BOX
is a 89 y/o female presented w/failure to thrive and dehydration. She failed her speech and swallow evaluation and I tried to call her family with regards to peg tube placement. She's not mentating very well, and I'm not sure if she has capacity. I've left messages and placed a social work consult to try to find some of the family members. She appears more comfortable and is mentating today, but her prognosis is guarded.    I considered obtaining an MRI but I need permission and  appears contracted in bed and cannot be positioned to lie down flat and still. Her ex- with whom I spoke to today, refuses to make major decisions regarding her life including DNR/DNI. I've called a GI consult to evaluate her for a PEG tube and speech and swallow are going to come to evaluate her again today. She most likely has arthritis and is contracted, possibly from prolonged bed-rest at home. I've ordered her a lidocaine patch for her right knee and have ordered tylenol IV to help with pain control. I will call her daughter Cristina, who lives in [place] for further instructions regarding her care.     Summary:   REVIEW OF SYSTEMS    General: "I'm doing okay, I'm feeling a little better."	    Skin/Breast:  	  Ophthalmologic:  	  ENMT:	    Respiratory and Thorax:  	  Cardiovascular:	    Gastrointestinal:	    Genitourinary:	    Musculoskeletal:	    Neurological:	    Psychiatric:	    Hematology/Lymphatics:	    Endocrine:	    Allergic/Immunologic:	  Vital Signs Last 24 Hrs  T(C): 36.6 (07 Aug 2017 12:45), Max: 36.6 (07 Aug 2017 12:45)  T(F): 97.8 (07 Aug 2017 12:45), Max: 97.8 (07 Aug 2017 12:45)  HR: 88 (07 Aug 2017 12:45) (58 - 88)  BP: 182/86 (07 Aug 2017 12:45) (146/70 - 182/86)  BP(mean): --  RR: 18 (07 Aug 2017 12:45) (18 - 20)  SpO2: 97% (07 Aug 2017 12:45) (97% - 100%)  PHYSICAL EXAM:  GEN: elderly female, lying in bed, resting her head on the rail  HEENT: dry MM  CVS: S1S2 RRR  PULM: CTABL, good breath sounds  ABD: soft, nontender, no rebound, no guarding  EXTREM: no edema  NEURO: difficult to assess, she's contracted in bed, has pain in the joints  PSYCH: +dementia  SKIN: warm                          12.6   3.9   )-----------( 101      ( 07 Aug 2017 07:54 )             38.5     08-07    144  |  108<H>  |  12.0  ----------------------------<  131<H>  3.4<L>   |  30.0<H>  |  0.65    Ca    9.4      07 Aug 2017 07:54  Phos  1.6     08-07  Mg     1.9     08-07    Radiology:     MEDICATIONS  (STANDING):  enoxaparin Injectable 30 milliGRAM(s) SubCutaneous every 24 hours  cefTRIAXone   IVPB   IV Intermittent   cefTRIAXone   IVPB 1 Gram(s) IV Intermittent every 24 hours  celecoxib 200 milliGRAM(s) Oral daily  pantoprazole  Injectable 40 milliGRAM(s) IV Push daily  dextrose 5% + lactated ringers. 1000 milliLiter(s) (75 mL/Hr) IV Continuous <Continuous>  sodium phosphate IVPB 15 milliMole(s) IV Intermittent once  potassium chloride  10 mEq/100 mL IVPB 10 milliEquivalent(s) IV Intermittent every 1 hour  lidocaine   Patch 1 Patch Transdermal daily    MEDICATIONS  (PRN):  hydrALAZINE Injectable 10 milliGRAM(s) IV Push every 3 hours PRN SBP >160

## 2017-08-08 DIAGNOSIS — M19.90 UNSPECIFIED OSTEOARTHRITIS, UNSPECIFIED SITE: ICD-10-CM

## 2017-08-08 DIAGNOSIS — E44.1 MILD PROTEIN-CALORIE MALNUTRITION: ICD-10-CM

## 2017-08-08 LAB
ANION GAP SERPL CALC-SCNC: 11 MMOL/L — SIGNIFICANT CHANGE UP (ref 5–17)
BUN SERPL-MCNC: 9 MG/DL — SIGNIFICANT CHANGE UP (ref 8–20)
CALCIUM SERPL-MCNC: 9.4 MG/DL — SIGNIFICANT CHANGE UP (ref 8.6–10.2)
CHLORIDE SERPL-SCNC: 105 MMOL/L — SIGNIFICANT CHANGE UP (ref 98–107)
CO2 SERPL-SCNC: 28 MMOL/L — SIGNIFICANT CHANGE UP (ref 22–29)
CREAT SERPL-MCNC: 0.61 MG/DL — SIGNIFICANT CHANGE UP (ref 0.5–1.3)
GLUCOSE SERPL-MCNC: 126 MG/DL — HIGH (ref 70–115)
HCT VFR BLD CALC: 39.9 % — SIGNIFICANT CHANGE UP (ref 37–47)
HGB BLD-MCNC: 13.2 G/DL — SIGNIFICANT CHANGE UP (ref 12–16)
MAGNESIUM SERPL-MCNC: 1.8 MG/DL — SIGNIFICANT CHANGE UP (ref 1.6–2.6)
MCHC RBC-ENTMCNC: 31.3 PG — HIGH (ref 27–31)
MCHC RBC-ENTMCNC: 33.1 G/DL — SIGNIFICANT CHANGE UP (ref 32–36)
MCV RBC AUTO: 94.5 FL — SIGNIFICANT CHANGE UP (ref 81–99)
PHOSPHATE SERPL-MCNC: 2.4 MG/DL — SIGNIFICANT CHANGE UP (ref 2.4–4.7)
PLATELET # BLD AUTO: 116 K/UL — LOW (ref 150–400)
POTASSIUM SERPL-MCNC: 4.2 MMOL/L — SIGNIFICANT CHANGE UP (ref 3.5–5.3)
POTASSIUM SERPL-SCNC: 4.2 MMOL/L — SIGNIFICANT CHANGE UP (ref 3.5–5.3)
RBC # BLD: 4.22 M/UL — LOW (ref 4.4–5.2)
RBC # FLD: 15.3 % — SIGNIFICANT CHANGE UP (ref 11–15.6)
SODIUM SERPL-SCNC: 144 MMOL/L — SIGNIFICANT CHANGE UP (ref 135–145)
WBC # BLD: 4 K/UL — LOW (ref 4.8–10.8)
WBC # FLD AUTO: 4 K/UL — LOW (ref 4.8–10.8)

## 2017-08-08 PROCEDURE — 99232 SBSQ HOSP IP/OBS MODERATE 35: CPT

## 2017-08-08 PROCEDURE — 99233 SBSQ HOSP IP/OBS HIGH 50: CPT

## 2017-08-08 PROCEDURE — 71010: CPT | Mod: 26

## 2017-08-08 RX ORDER — AMLODIPINE BESYLATE 2.5 MG/1
10 TABLET ORAL DAILY
Qty: 0 | Refills: 0 | Status: DISCONTINUED | OUTPATIENT
Start: 2017-08-08 | End: 2017-08-16

## 2017-08-08 RX ORDER — LISINOPRIL 2.5 MG/1
5 TABLET ORAL DAILY
Qty: 0 | Refills: 0 | Status: DISCONTINUED | OUTPATIENT
Start: 2017-08-08 | End: 2017-08-10

## 2017-08-08 RX ORDER — AMLODIPINE BESYLATE 2.5 MG/1
5 TABLET ORAL DAILY
Qty: 0 | Refills: 0 | Status: DISCONTINUED | OUTPATIENT
Start: 2017-08-08 | End: 2017-08-08

## 2017-08-08 RX ORDER — METOPROLOL TARTRATE 50 MG
50 TABLET ORAL EVERY 8 HOURS
Qty: 0 | Refills: 0 | Status: DISCONTINUED | OUTPATIENT
Start: 2017-08-08 | End: 2017-08-16

## 2017-08-08 RX ORDER — GABAPENTIN 400 MG/1
100 CAPSULE ORAL THREE TIMES A DAY
Qty: 0 | Refills: 0 | Status: DISCONTINUED | OUTPATIENT
Start: 2017-08-08 | End: 2017-08-11

## 2017-08-08 RX ORDER — PANTOPRAZOLE SODIUM 20 MG/1
40 TABLET, DELAYED RELEASE ORAL
Qty: 0 | Refills: 0 | Status: DISCONTINUED | OUTPATIENT
Start: 2017-08-08 | End: 2017-08-16

## 2017-08-08 RX ORDER — MIRTAZAPINE 45 MG/1
15 TABLET, ORALLY DISINTEGRATING ORAL AT BEDTIME
Qty: 0 | Refills: 0 | Status: DISCONTINUED | OUTPATIENT
Start: 2017-08-08 | End: 2017-08-16

## 2017-08-08 RX ORDER — MAGNESIUM SULFATE 500 MG/ML
2 VIAL (ML) INJECTION ONCE
Qty: 0 | Refills: 0 | Status: COMPLETED | OUTPATIENT
Start: 2017-08-08 | End: 2017-08-08

## 2017-08-08 RX ADMIN — Medication 10 MILLIGRAM(S): at 17:05

## 2017-08-08 RX ADMIN — GABAPENTIN 100 MILLIGRAM(S): 400 CAPSULE ORAL at 16:58

## 2017-08-08 RX ADMIN — ENOXAPARIN SODIUM 30 MILLIGRAM(S): 100 INJECTION SUBCUTANEOUS at 12:46

## 2017-08-08 RX ADMIN — CELECOXIB 200 MILLIGRAM(S): 200 CAPSULE ORAL at 12:47

## 2017-08-08 RX ADMIN — LIDOCAINE 1 PATCH: 4 CREAM TOPICAL at 05:09

## 2017-08-08 RX ADMIN — GABAPENTIN 100 MILLIGRAM(S): 400 CAPSULE ORAL at 21:09

## 2017-08-08 RX ADMIN — LISINOPRIL 5 MILLIGRAM(S): 2.5 TABLET ORAL at 12:44

## 2017-08-08 RX ADMIN — Medication 50 MILLIGRAM(S): at 12:45

## 2017-08-08 RX ADMIN — LIDOCAINE 1 PATCH: 4 CREAM TOPICAL at 12:45

## 2017-08-08 RX ADMIN — CELECOXIB 200 MILLIGRAM(S): 200 CAPSULE ORAL at 13:20

## 2017-08-08 RX ADMIN — LIDOCAINE 1 PATCH: 4 CREAM TOPICAL at 21:09

## 2017-08-08 RX ADMIN — Medication 50 MILLIGRAM(S): at 21:09

## 2017-08-08 RX ADMIN — PANTOPRAZOLE SODIUM 40 MILLIGRAM(S): 20 TABLET, DELAYED RELEASE ORAL at 12:46

## 2017-08-08 RX ADMIN — Medication 10 MILLIGRAM(S): at 10:15

## 2017-08-08 RX ADMIN — Medication 50 GRAM(S): at 12:44

## 2017-08-08 RX ADMIN — MIRTAZAPINE 15 MILLIGRAM(S): 45 TABLET, ORALLY DISINTEGRATING ORAL at 21:09

## 2017-08-08 NOTE — OCCUPATIONAL THERAPY INITIAL EVALUATION ADULT - RANGE OF MOTION EXAMINATION, UPPER EXTREMITY
bilateral UE Active Assistive ROM was WNL (within normal limits) Right UE Passive ROM was WNL (within normal limits)/Left UE Active ROM was WNL (within normal limits)

## 2017-08-08 NOTE — PHYSICAL THERAPY INITIAL EVALUATION ADULT - ADDITIONAL COMMENTS
pt poor historian. hx obtained from chart. pt lives with ex- and has been dependent with all mobility. has aides 3hours/day, 3 days/week. per chart, caregiver no longer able to provide care.

## 2017-08-08 NOTE — PROGRESS NOTE ADULT - SUBJECTIVE AND OBJECTIVE BOX
is a 89 y/o female presented w/failure to thrive and dehydration. She failed her speech and swallow evaluation on 08/07 but is doing much better today and is able to tolerate a pureed diet. I spoke with her daughter Cristina in [place] who confirms that her mother is a DNR/DNI.  has no capacity given her dementia and she was being taken care of by her her ex- but he is unable to provide further care for her. Her daughter agrees that she can make medical decisions for her (as her other children are not interested in participating in her care and are not interested in making any medical decisions).  is eating pureed diet today, and I've held off on the idea of peg tube for now. I am treating her pain with celecoxib, gabapentin and a lidocaine patch over her knee to help her become un-contracted. She will need to be discharged to a nursing home.     Of note, I've filled out the [facility]OL[facility] form and have asked nursing to confirm daughter Cristina's decision about the DNR/DNI.      Summary:   REVIEW OF SYSTEMS    General: "I'm doing okay, I'm feeling a little better. I want to eat toady."	    Skin/Breast:  	  Ophthalmologic:  	  ENMT:	    Respiratory and Thorax:  	  Cardiovascular:	    Gastrointestinal:	    Genitourinary:	    Musculoskeletal:	    Neurological:	    Psychiatric:	    Hematology/Lymphatics:	    Endocrine:	    Allergic/Immunologic:	  Vital Signs Last 24 Hrs  146/78, HR=82  PHYSICAL EXAM:  GEN: elderly female, lying in bed, resting her head on the rail  HEENT: dry MM  CVS: S1S2 RRR  PULM: CTABL, good breath sounds  ABD: soft, nontender, no rebound, no guarding  EXTREM: no edema  NEURO: difficult to assess, she's contracted in bed, has pain in the joints  PSYCH: +dementia  SKIN: warm                       12.6   3.9   )-----------( 101      ( 07 Aug 2017 07:54 )             38.5     08-07    144  |  108<H>  |  12.0  ----------------------------<  131<H>  3.4<L>   |  30.0<H>  |  0.65    Ca    9.4      07 Aug 2017 07:54  Phos  1.6     08-07  Mg     1.9     08-07    Radiology:     MEDICATIONS  (STANDING):  enoxaparin Injectable 30 milliGRAM(s) SubCutaneous every 24 hours  celecoxib 200 milliGRAM(s) Oral daily  lidocaine   Patch 1 Patch Transdermal daily  metoprolol 50 milliGRAM(s) Oral every 8 hours  amLODIPine   Tablet 10 milliGRAM(s) Oral daily  lisinopril 5 milliGRAM(s) Oral daily  gabapentin 100 milliGRAM(s) Oral three times a day  pantoprazole    Tablet 40 milliGRAM(s) Oral before breakfast    MEDICATIONS  (PRN):  hydrALAZINE Injectable 10 milliGRAM(s) IV Push every 3 hours PRN SBP >160

## 2017-08-08 NOTE — PHYSICAL THERAPY INITIAL EVALUATION ADULT - MUSCLE TONE ASSESSMENT, REHAB EVAL
bilateral upper extremities/bilateral lower extremities/difficult to assess due to positioning and contractures/normal

## 2017-08-08 NOTE — PROGRESS NOTE ADULT - ASSESSMENT
89 yo female unable to communicate; contracted with ?swallow problem.  Per swallow eval should tolerate defined diet as above.  Continue to support.  Needs assisstance for feeding.  Consideration for peg on hold.  Consider kostas count and ongoing nutr assessment.

## 2017-08-08 NOTE — PROGRESS NOTE ADULT - SUBJECTIVE AND OBJECTIVE BOX
Pt seen and examined   cc .  Denies pain; passing flatus; no distention.  Seen for swallow eval and per nursing gracie thick liquids and nectar.  Remains contracted precluding possible PEG but based on todays swallow eval should not need peg if patient is fed.    REVIEW OF SYSTEMS:  Constitutional: No fever, weight loss or fatigue  Cardiovascular: No chest pain, palpitations, dizziness or leg swelling  Pulm:  No cough, sob, wheeze  Skin: No itching, burning, rashes or lesions       MEDICATIONS:  MEDICATIONS  (STANDING):  enoxaparin Injectable 30 milliGRAM(s) SubCutaneous every 24 hours  celecoxib 200 milliGRAM(s) Oral daily  pantoprazole  Injectable 40 milliGRAM(s) IV Push daily  lidocaine   Patch 1 Patch Transdermal daily  magnesium sulfate  IVPB 2 Gram(s) IV Intermittent once  metoprolol 50 milliGRAM(s) Oral every 8 hours  amLODIPine   Tablet 10 milliGRAM(s) Oral daily  lisinopril 5 milliGRAM(s) Oral daily    MEDICATIONS  (PRN):  hydrALAZINE Injectable 10 milliGRAM(s) IV Push every 3 hours PRN SBP >160      Allergies    Allergy Status Unknown    Intolerances        Vital Signs Last 24 Hrs  T(C): 36.6 (08 Aug 2017 00:30), Max: 36.6 (07 Aug 2017 12:45)  T(F): 97.9 (08 Aug 2017 00:30), Max: 97.9 (08 Aug 2017 00:30)  HR: 88 (08 Aug 2017 09:33) (78 - 88)  BP: 196/78 (08 Aug 2017 09:33) (154/76 - 196/78)  BP(mean): --  RR: 18 (08 Aug 2017 09:33) (18 - 18)  SpO2: 98% (08 Aug 2017 09:33) (97% - 99%)    08-07 @ 07:01  -  08-08 @ 07:00  --------------------------------------------------------  IN: 1300 mL / OUT: 0 mL / NET: 1300 mL        PHYSICAL EXAM:    General: Well developed; well nourished; in no acute distress  HEENT: Normocephalic; Anicteric, eom intact, throat clear   HEART nsr; no mrg  LUNGS:  clear to ippa  Gastrointestinal: Abd soft; bs wnl.  No masses, organomegaly or tenderness  Skin: Warm and dry. No obvious rash  EXT neg without edema    LABS:      CBC Full  -  ( 08 Aug 2017 07:07 )  WBC Count : 4.0 K/uL  Hemoglobin : 13.2 g/dL  Hematocrit : 39.9 %  Platelet Count - Automated : 116 K/uL  Mean Cell Volume : 94.5 fl  Mean Cell Hemoglobin : 31.3 pg  Mean Cell Hemoglobin Concentration : 33.1 g/dL  Auto Neutrophil # : x  Auto Lymphocyte # : x  Auto Monocyte # : x  Auto Eosinophil # : x  Auto Basophil # : x  Auto Neutrophil % : x  Auto Lymphocyte % : x  Auto Monocyte % : x  Auto Eosinophil % : x  Auto Basophil % : x    08-08    144  |  105  |  9.0  ----------------------------<  126<H>  4.2   |  28.0  |  0.61    Ca    9.4      08 Aug 2017 07:07  Phos  2.4     08-08  Mg     1.8     08-08                        RADIOLOGY & ADDITIONAL STUDIES (The following images were personally reviewed):

## 2017-08-08 NOTE — PHYSICAL THERAPY INITIAL EVALUATION ADULT - MANUAL MUSCLE TESTING RESULTS, REHAB EVAL
formal testing limited by cognition. pt moves UEs against gravity. LE testing limited by knee flexion contractures. pt does flex hips in response to knee ext.

## 2017-08-08 NOTE — PHYSICAL THERAPY INITIAL EVALUATION ADULT - RANGE OF MOTION EXAMINATION, REHAB EVAL
bilateral UEs WFL active assist, LE assessment limited by pain and positioning bilateral ankle df WFL, right knee w/flexion contracture(pain w/extension attempts), left knee also appears contracted as attempts to straighten elicit pain. hips unable to be assessed due to unsuccessful attempts at positioning.

## 2017-08-08 NOTE — PHYSICAL THERAPY INITIAL EVALUATION ADULT - PREDICTED DURATION OF THERAPY (DAYS/WKS), PT EVAL
per chart, pt was dependent for all mobility and appears at baseline. no skilled needs at this time. will not follow

## 2017-08-08 NOTE — OCCUPATIONAL THERAPY INITIAL EVALUATION ADULT - COGNITIVE, VISUAL PERCEPTUAL, OT EVAL
Will continue to monitor pt's cognitive status as it applies to basic ADL's and functional mobility tasks

## 2017-08-09 LAB
ANION GAP SERPL CALC-SCNC: 11 MMOL/L — SIGNIFICANT CHANGE UP (ref 5–17)
BUN SERPL-MCNC: 9 MG/DL — SIGNIFICANT CHANGE UP (ref 8–20)
CALCIUM SERPL-MCNC: 8.9 MG/DL — SIGNIFICANT CHANGE UP (ref 8.6–10.2)
CHLORIDE SERPL-SCNC: 105 MMOL/L — SIGNIFICANT CHANGE UP (ref 98–107)
CO2 SERPL-SCNC: 26 MMOL/L — SIGNIFICANT CHANGE UP (ref 22–29)
CREAT SERPL-MCNC: 0.78 MG/DL — SIGNIFICANT CHANGE UP (ref 0.5–1.3)
GLUCOSE SERPL-MCNC: 100 MG/DL — SIGNIFICANT CHANGE UP (ref 70–115)
HCT VFR BLD CALC: 35.4 % — LOW (ref 37–47)
HGB BLD-MCNC: 11.6 G/DL — LOW (ref 12–16)
MAGNESIUM SERPL-MCNC: 2.1 MG/DL — SIGNIFICANT CHANGE UP (ref 1.6–2.6)
MCHC RBC-ENTMCNC: 30.8 PG — SIGNIFICANT CHANGE UP (ref 27–31)
MCHC RBC-ENTMCNC: 32.8 G/DL — SIGNIFICANT CHANGE UP (ref 32–36)
MCV RBC AUTO: 93.9 FL — SIGNIFICANT CHANGE UP (ref 81–99)
PHOSPHATE SERPL-MCNC: 2.5 MG/DL — SIGNIFICANT CHANGE UP (ref 2.4–4.7)
PLATELET # BLD AUTO: 130 K/UL — LOW (ref 150–400)
POTASSIUM SERPL-MCNC: 3.2 MMOL/L — LOW (ref 3.5–5.3)
POTASSIUM SERPL-SCNC: 3.2 MMOL/L — LOW (ref 3.5–5.3)
RBC # BLD: 3.77 M/UL — LOW (ref 4.4–5.2)
RBC # FLD: 15.3 % — SIGNIFICANT CHANGE UP (ref 11–15.6)
SODIUM SERPL-SCNC: 142 MMOL/L — SIGNIFICANT CHANGE UP (ref 135–145)
WBC # BLD: 3.1 K/UL — LOW (ref 4.8–10.8)
WBC # FLD AUTO: 3.1 K/UL — LOW (ref 4.8–10.8)

## 2017-08-09 PROCEDURE — 99233 SBSQ HOSP IP/OBS HIGH 50: CPT

## 2017-08-09 PROCEDURE — 99232 SBSQ HOSP IP/OBS MODERATE 35: CPT

## 2017-08-09 RX ADMIN — GABAPENTIN 100 MILLIGRAM(S): 400 CAPSULE ORAL at 23:12

## 2017-08-09 RX ADMIN — GABAPENTIN 100 MILLIGRAM(S): 400 CAPSULE ORAL at 05:50

## 2017-08-09 RX ADMIN — LISINOPRIL 5 MILLIGRAM(S): 2.5 TABLET ORAL at 05:50

## 2017-08-09 RX ADMIN — Medication 50 MILLIGRAM(S): at 05:50

## 2017-08-09 RX ADMIN — GABAPENTIN 100 MILLIGRAM(S): 400 CAPSULE ORAL at 14:18

## 2017-08-09 RX ADMIN — Medication 50 MILLIGRAM(S): at 23:12

## 2017-08-09 RX ADMIN — ENOXAPARIN SODIUM 30 MILLIGRAM(S): 100 INJECTION SUBCUTANEOUS at 10:17

## 2017-08-09 RX ADMIN — LIDOCAINE 1 PATCH: 4 CREAM TOPICAL at 12:17

## 2017-08-09 RX ADMIN — Medication 50 MILLIGRAM(S): at 14:18

## 2017-08-09 RX ADMIN — PANTOPRAZOLE SODIUM 40 MILLIGRAM(S): 20 TABLET, DELAYED RELEASE ORAL at 05:51

## 2017-08-09 RX ADMIN — MIRTAZAPINE 15 MILLIGRAM(S): 45 TABLET, ORALLY DISINTEGRATING ORAL at 23:12

## 2017-08-09 RX ADMIN — AMLODIPINE BESYLATE 10 MILLIGRAM(S): 2.5 TABLET ORAL at 05:50

## 2017-08-09 RX ADMIN — CELECOXIB 200 MILLIGRAM(S): 200 CAPSULE ORAL at 12:17

## 2017-08-09 NOTE — PROGRESS NOTE ADULT - SUBJECTIVE AND OBJECTIVE BOX
Patient seen and examined;  Still refusing PEG.  Didn't eat much when fed by staff;  Ate 50% of meal when fed by family.  Dysphagia 1, pureed with Nectar fluids.      PAST MEDICAL & SURGICAL HISTORY:  No pertinent past medical history  No significant past surgical history      ROS:  No Heartburn, regurgitation, dysphagia, odynophagia.  No dyspepsia  No abdominal pain.    No Nausea, vomiting.  No Bleeding.  No hematemesis.   No diarrhea.    No hematochesia.  No weight loss, anorexia.  No edema.      MEDICATIONS  (STANDING):  enoxaparin Injectable 30 milliGRAM(s) SubCutaneous every 24 hours  celecoxib 200 milliGRAM(s) Oral daily  lidocaine   Patch 1 Patch Transdermal daily  metoprolol 50 milliGRAM(s) Oral every 8 hours  amLODIPine   Tablet 10 milliGRAM(s) Oral daily  lisinopril 5 milliGRAM(s) Oral daily  gabapentin 100 milliGRAM(s) Oral three times a day  pantoprazole    Tablet 40 milliGRAM(s) Oral before breakfast  mirtazapine 15 milliGRAM(s) Oral at bedtime    MEDICATIONS  (PRN):  hydrALAZINE Injectable 10 milliGRAM(s) IV Push every 3 hours PRN SBP >160      Allergies    Allergy Status Unknown    Intolerances        Vital Signs Last 24 Hrs  T(C): 37.1 (09 Aug 2017 08:18), Max: 37.4 (09 Aug 2017 05:38)  T(F): 98.8 (09 Aug 2017 08:18), Max: 99.4 (09 Aug 2017 05:38)  HR: 64 (09 Aug 2017 08:18) (64 - 88)  BP: 146/66 (09 Aug 2017 08:18) (143/55 - 151/67)  BP(mean): --  RR: 18 (09 Aug 2017 08:18) (18 - 18)  SpO2: 96% (09 Aug 2017 08:18) (96% - 97%)    PHYSICAL EXAM:    GENERAL: NAD, well-groomed, well-developed  HEAD:  Atraumatic, Normocephalic  EYES: EOMI, PERRLA, conjunctiva and sclera clear  ENMT: No tonsillar erythema, exudates, or enlargement; Moist mucous membranes, Good dentition, No lesions  NECK: Supple, No JVD, Normal thyroid  CHEST/LUNG: Clear to percussion bilaterally; No rales, rhonchi, wheezing, or rubs  HEART: Regular rate and rhythm; No murmurs, rubs, or gallops  ABDOMEN: Soft, Nontender, Nondistended; Bowel sounds present  EXTREMITIES:  2+ Peripheral Pulses, No clubbing, cyanosis, or edema  LYMPH: No lymphadenopathy noted  SKIN: No rashes or lesions      LABS:                        11.6   3.1   )-----------( 130      ( 09 Aug 2017 08:56 )             35.4     08-09    142  |  105  |  9.0  ----------------------------<  100  3.2<L>   |  26.0  |  0.78    Ca    8.9      09 Aug 2017 08:56  Phos  2.5     08-09  Mg     2.1     08-09               RADIOLOGY & ADDITIONAL STUDIES:

## 2017-08-10 LAB
ANION GAP SERPL CALC-SCNC: 12 MMOL/L — SIGNIFICANT CHANGE UP (ref 5–17)
BUN SERPL-MCNC: 12 MG/DL — SIGNIFICANT CHANGE UP (ref 8–20)
CALCIUM SERPL-MCNC: 9.8 MG/DL — SIGNIFICANT CHANGE UP (ref 8.6–10.2)
CHLORIDE SERPL-SCNC: 106 MMOL/L — SIGNIFICANT CHANGE UP (ref 98–107)
CO2 SERPL-SCNC: 27 MMOL/L — SIGNIFICANT CHANGE UP (ref 22–29)
CREAT SERPL-MCNC: 0.72 MG/DL — SIGNIFICANT CHANGE UP (ref 0.5–1.3)
GLUCOSE SERPL-MCNC: 144 MG/DL — HIGH (ref 70–115)
HCT VFR BLD CALC: 43.5 % — SIGNIFICANT CHANGE UP (ref 37–47)
HGB BLD-MCNC: 14.6 G/DL — SIGNIFICANT CHANGE UP (ref 12–16)
MAGNESIUM SERPL-MCNC: 2.1 MG/DL — SIGNIFICANT CHANGE UP (ref 1.6–2.6)
MCHC RBC-ENTMCNC: 32 PG — HIGH (ref 27–31)
MCHC RBC-ENTMCNC: 33.6 G/DL — SIGNIFICANT CHANGE UP (ref 32–36)
MCV RBC AUTO: 95.4 FL — SIGNIFICANT CHANGE UP (ref 81–99)
PHOSPHATE SERPL-MCNC: 2.2 MG/DL — LOW (ref 2.4–4.7)
PLATELET # BLD AUTO: 147 K/UL — LOW (ref 150–400)
POTASSIUM SERPL-MCNC: 3.1 MMOL/L — LOW (ref 3.5–5.3)
POTASSIUM SERPL-SCNC: 3.1 MMOL/L — LOW (ref 3.5–5.3)
RBC # BLD: 4.56 M/UL — SIGNIFICANT CHANGE UP (ref 4.4–5.2)
RBC # FLD: 15.6 % — SIGNIFICANT CHANGE UP (ref 11–15.6)
SODIUM SERPL-SCNC: 145 MMOL/L — SIGNIFICANT CHANGE UP (ref 135–145)
WBC # BLD: 5.7 K/UL — SIGNIFICANT CHANGE UP (ref 4.8–10.8)
WBC # FLD AUTO: 5.7 K/UL — SIGNIFICANT CHANGE UP (ref 4.8–10.8)

## 2017-08-10 PROCEDURE — 99233 SBSQ HOSP IP/OBS HIGH 50: CPT

## 2017-08-10 RX ORDER — LISINOPRIL 2.5 MG/1
10 TABLET ORAL DAILY
Qty: 0 | Refills: 0 | Status: DISCONTINUED | OUTPATIENT
Start: 2017-08-11 | End: 2017-08-11

## 2017-08-10 RX ORDER — LISINOPRIL 2.5 MG/1
5 TABLET ORAL ONCE
Qty: 0 | Refills: 0 | Status: COMPLETED | OUTPATIENT
Start: 2017-08-10 | End: 2017-08-10

## 2017-08-10 RX ORDER — POTASSIUM CHLORIDE 20 MEQ
40 PACKET (EA) ORAL EVERY 4 HOURS
Qty: 0 | Refills: 0 | Status: COMPLETED | OUTPATIENT
Start: 2017-08-10 | End: 2017-08-10

## 2017-08-10 RX ORDER — TRAZODONE HCL 50 MG
50 TABLET ORAL AT BEDTIME
Qty: 0 | Refills: 0 | Status: DISCONTINUED | OUTPATIENT
Start: 2017-08-10 | End: 2017-08-11

## 2017-08-10 RX ORDER — DRONABINOL 2.5 MG
2.5 CAPSULE ORAL
Qty: 0 | Refills: 0 | Status: DISCONTINUED | OUTPATIENT
Start: 2017-08-10 | End: 2017-08-10

## 2017-08-10 RX ORDER — DRONABINOL 2.5 MG
5 CAPSULE ORAL
Qty: 0 | Refills: 0 | Status: DISCONTINUED | OUTPATIENT
Start: 2017-08-10 | End: 2017-08-16

## 2017-08-10 RX ADMIN — CELECOXIB 200 MILLIGRAM(S): 200 CAPSULE ORAL at 14:31

## 2017-08-10 RX ADMIN — Medication 50 MILLIGRAM(S): at 22:14

## 2017-08-10 RX ADMIN — LISINOPRIL 5 MILLIGRAM(S): 2.5 TABLET ORAL at 05:56

## 2017-08-10 RX ADMIN — Medication 40 MILLIEQUIVALENT(S): at 05:56

## 2017-08-10 RX ADMIN — LISINOPRIL 5 MILLIGRAM(S): 2.5 TABLET ORAL at 09:54

## 2017-08-10 RX ADMIN — LIDOCAINE 1 PATCH: 4 CREAM TOPICAL at 01:57

## 2017-08-10 RX ADMIN — GABAPENTIN 100 MILLIGRAM(S): 400 CAPSULE ORAL at 22:14

## 2017-08-10 RX ADMIN — Medication 50 MILLIGRAM(S): at 05:56

## 2017-08-10 RX ADMIN — GABAPENTIN 100 MILLIGRAM(S): 400 CAPSULE ORAL at 05:56

## 2017-08-10 RX ADMIN — AMLODIPINE BESYLATE 10 MILLIGRAM(S): 2.5 TABLET ORAL at 05:56

## 2017-08-10 RX ADMIN — Medication 50 MILLIGRAM(S): at 14:32

## 2017-08-10 RX ADMIN — Medication 63.75 MILLIMOLE(S): at 14:36

## 2017-08-10 RX ADMIN — PANTOPRAZOLE SODIUM 40 MILLIGRAM(S): 20 TABLET, DELAYED RELEASE ORAL at 05:56

## 2017-08-10 RX ADMIN — Medication 5 MILLIGRAM(S): at 22:13

## 2017-08-10 RX ADMIN — LIDOCAINE 1 PATCH: 4 CREAM TOPICAL at 14:31

## 2017-08-10 RX ADMIN — MIRTAZAPINE 15 MILLIGRAM(S): 45 TABLET, ORALLY DISINTEGRATING ORAL at 22:14

## 2017-08-10 RX ADMIN — Medication 2.5 MILLIGRAM(S): at 05:56

## 2017-08-10 RX ADMIN — ENOXAPARIN SODIUM 30 MILLIGRAM(S): 100 INJECTION SUBCUTANEOUS at 14:31

## 2017-08-10 RX ADMIN — Medication 10 MILLIGRAM(S): at 00:05

## 2017-08-10 RX ADMIN — Medication 10 MILLIGRAM(S): at 23:47

## 2017-08-10 RX ADMIN — GABAPENTIN 100 MILLIGRAM(S): 400 CAPSULE ORAL at 14:31

## 2017-08-10 NOTE — PROGRESS NOTE ADULT - SUBJECTIVE AND OBJECTIVE BOX
is a 89 y/o female presented w/failure to thrive and dehydration. She failed her speech and swallow evaluation on 08/07 but is doing much better today and is able to tolerate a pureed diet. I spoke with her daughter Cristina in [place] who confirms that her mother is a DNR/DNI.  has no capacity given her dementia and she was being taken care of by her her ex- but he is unable to provide further care for her. Her daughter agrees that she can make medical decisions for her (as her other children are not interested in participating in her care and are not interested in making any medical decisions).  is eating pureed diet today, and I've held off on the idea of peg tube for now. I am treating her pain with celecoxib, gabapentin and a lidocaine patch over her knee to help her become un-contracted. She will need to be discharged to a nursing home.     Of note, I've filled out the [facility] form and have asked nursing to confirm daughter Cristina's decision about the DNR/DNI.  is eating better today, we've increased her robinul and encouraged her to move around. We appreciate PT's help in mobilizing her. She appears less contracted and we'll get her out of bed to chair tomm.     Summary:   REVIEW OF SYSTEMS    General: "I'm doing okay, I'm feeling better."    Skin/Breast:  	  Ophthalmologic:  	  ENMT:	    Respiratory and Thorax:  	  Cardiovascular:	    Gastrointestinal:	    Genitourinary:	    Musculoskeletal:	    Neurological:	    Psychiatric:	    Hematology/Lymphatics:	    Endocrine:	    Allergic/Immunologic:	  Vital Signs Last 24 Hrs  166/70, HR+67  PHYSICAL EXAM:  GEN: elderly female, lying in bed, resting her head on the rail  HEENT: dry MM  CVS: S1S2 RRR  PULM: CTABL, good breath sounds  ABD: soft, nontender, no rebound, no guarding  EXTREM: no edema  NEURO: difficult to assess, she's contracted in bed, has pain in the joints  PSYCH: +dementia  SKIN: warm                       12.6   3.9   )-----------( 101      ( 07 Aug 2017 07:54 )             38.5     08-07    144  |  108<H>  |  12.0  ----------------------------<  131<H>  3.4<L>   |  30.0<H>  |  0.65    Ca    9.4      07 Aug 2017 07:54  Phos  1.6     08-07  Mg     1.9     08-07    MEDICATIONS  (STANDING):  enoxaparin Injectable 30 milliGRAM(s) SubCutaneous every 24 hours  celecoxib 200 milliGRAM(s) Oral daily  lidocaine   Patch 1 Patch Transdermal daily  metoprolol 50 milliGRAM(s) Oral every 8 hours  amLODIPine   Tablet 10 milliGRAM(s) Oral daily  gabapentin 100 milliGRAM(s) Oral three times a day  pantoprazole    Tablet 40 milliGRAM(s) Oral before breakfast  mirtazapine 15 milliGRAM(s) Oral at bedtime  potassium chloride    Tablet ER 40 milliEquivalent(s) Oral every 4 hours  dronabinol 5 milliGRAM(s) Oral two times a day  sodium phosphate IVPB 15 milliMole(s) IV Intermittent once    MEDICATIONS  (PRN):  hydrALAZINE Injectable 10 milliGRAM(s) IV Push every 3 hours PRN SBP >160

## 2017-08-11 ENCOUNTER — TRANSCRIPTION ENCOUNTER (OUTPATIENT)
Age: 82
End: 2017-08-11

## 2017-08-11 LAB
ANION GAP SERPL CALC-SCNC: 12 MMOL/L — SIGNIFICANT CHANGE UP (ref 5–17)
BUN SERPL-MCNC: 15 MG/DL — SIGNIFICANT CHANGE UP (ref 8–20)
CALCIUM SERPL-MCNC: 9.5 MG/DL — SIGNIFICANT CHANGE UP (ref 8.6–10.2)
CHLORIDE SERPL-SCNC: 106 MMOL/L — SIGNIFICANT CHANGE UP (ref 98–107)
CO2 SERPL-SCNC: 29 MMOL/L — SIGNIFICANT CHANGE UP (ref 22–29)
CREAT SERPL-MCNC: 0.65 MG/DL — SIGNIFICANT CHANGE UP (ref 0.5–1.3)
GLUCOSE SERPL-MCNC: 154 MG/DL — HIGH (ref 70–115)
HCT VFR BLD CALC: 41.8 % — SIGNIFICANT CHANGE UP (ref 37–47)
HGB BLD-MCNC: 13.6 G/DL — SIGNIFICANT CHANGE UP (ref 12–16)
MAGNESIUM SERPL-MCNC: 1.9 MG/DL — SIGNIFICANT CHANGE UP (ref 1.6–2.6)
MCHC RBC-ENTMCNC: 31.1 PG — HIGH (ref 27–31)
MCHC RBC-ENTMCNC: 32.5 G/DL — SIGNIFICANT CHANGE UP (ref 32–36)
MCV RBC AUTO: 95.7 FL — SIGNIFICANT CHANGE UP (ref 81–99)
PHOSPHATE SERPL-MCNC: 2.6 MG/DL — SIGNIFICANT CHANGE UP (ref 2.4–4.7)
PLATELET # BLD AUTO: 156 K/UL — SIGNIFICANT CHANGE UP (ref 150–400)
POTASSIUM SERPL-MCNC: 3.7 MMOL/L — SIGNIFICANT CHANGE UP (ref 3.5–5.3)
POTASSIUM SERPL-SCNC: 3.7 MMOL/L — SIGNIFICANT CHANGE UP (ref 3.5–5.3)
RBC # BLD: 4.37 M/UL — LOW (ref 4.4–5.2)
RBC # FLD: 15.4 % — SIGNIFICANT CHANGE UP (ref 11–15.6)
SODIUM SERPL-SCNC: 147 MMOL/L — HIGH (ref 135–145)
WBC # BLD: 5.8 K/UL — SIGNIFICANT CHANGE UP (ref 4.8–10.8)
WBC # FLD AUTO: 5.8 K/UL — SIGNIFICANT CHANGE UP (ref 4.8–10.8)

## 2017-08-11 RX ORDER — GABAPENTIN 400 MG/1
2 CAPSULE ORAL
Qty: 0 | Refills: 0 | COMMUNITY
Start: 2017-08-11

## 2017-08-11 RX ORDER — CELECOXIB 200 MG/1
1 CAPSULE ORAL
Qty: 0 | Refills: 0 | COMMUNITY
Start: 2017-08-11

## 2017-08-11 RX ORDER — AMLODIPINE BESYLATE 2.5 MG/1
0 TABLET ORAL
Qty: 0 | Refills: 0 | COMMUNITY

## 2017-08-11 RX ORDER — MAGNESIUM SULFATE 500 MG/ML
1 VIAL (ML) INJECTION ONCE
Qty: 0 | Refills: 0 | Status: COMPLETED | OUTPATIENT
Start: 2017-08-11 | End: 2017-08-11

## 2017-08-11 RX ORDER — LIDOCAINE 4 G/100G
1 CREAM TOPICAL
Qty: 0 | Refills: 0 | COMMUNITY
Start: 2017-08-11

## 2017-08-11 RX ORDER — POTASSIUM CHLORIDE 20 MEQ
40 PACKET (EA) ORAL ONCE
Qty: 0 | Refills: 0 | Status: COMPLETED | OUTPATIENT
Start: 2017-08-11 | End: 2017-08-11

## 2017-08-11 RX ORDER — GABAPENTIN 400 MG/1
200 CAPSULE ORAL EVERY 8 HOURS
Qty: 0 | Refills: 0 | Status: DISCONTINUED | OUTPATIENT
Start: 2017-08-11 | End: 2017-08-15

## 2017-08-11 RX ORDER — TRAZODONE HCL 50 MG
1 TABLET ORAL
Qty: 0 | Refills: 0 | COMMUNITY
Start: 2017-08-11

## 2017-08-11 RX ORDER — MORPHINE SULFATE 50 MG/1
1 CAPSULE, EXTENDED RELEASE ORAL ONCE
Qty: 0 | Refills: 0 | Status: DISCONTINUED | OUTPATIENT
Start: 2017-08-11 | End: 2017-08-11

## 2017-08-11 RX ORDER — LISINOPRIL 2.5 MG/1
10 TABLET ORAL ONCE
Qty: 0 | Refills: 0 | Status: COMPLETED | OUTPATIENT
Start: 2017-08-11 | End: 2017-08-11

## 2017-08-11 RX ORDER — MIRTAZAPINE 45 MG/1
1 TABLET, ORALLY DISINTEGRATING ORAL
Qty: 0 | Refills: 0 | COMMUNITY
Start: 2017-08-11

## 2017-08-11 RX ORDER — PANTOPRAZOLE SODIUM 20 MG/1
1 TABLET, DELAYED RELEASE ORAL
Qty: 0 | Refills: 0 | COMMUNITY
Start: 2017-08-11

## 2017-08-11 RX ORDER — LISINOPRIL 2.5 MG/1
1 TABLET ORAL
Qty: 0 | Refills: 0 | COMMUNITY
Start: 2017-08-11

## 2017-08-11 RX ORDER — DRONABINOL 2.5 MG
1 CAPSULE ORAL
Qty: 0 | Refills: 0 | COMMUNITY
Start: 2017-08-11

## 2017-08-11 RX ORDER — AMLODIPINE BESYLATE 2.5 MG/1
1 TABLET ORAL
Qty: 0 | Refills: 0 | COMMUNITY
Start: 2017-08-11

## 2017-08-11 RX ORDER — GABAPENTIN 400 MG/1
1 CAPSULE ORAL
Qty: 0 | Refills: 0 | COMMUNITY
Start: 2017-08-11

## 2017-08-11 RX ORDER — METOPROLOL TARTRATE 50 MG
1.5 TABLET ORAL
Qty: 45 | Refills: 0 | OUTPATIENT
Start: 2017-08-11 | End: 2017-09-10

## 2017-08-11 RX ORDER — LISINOPRIL 2.5 MG/1
20 TABLET ORAL DAILY
Qty: 0 | Refills: 0 | Status: DISCONTINUED | OUTPATIENT
Start: 2017-08-12 | End: 2017-08-16

## 2017-08-11 RX ORDER — ENOXAPARIN SODIUM 100 MG/ML
30 INJECTION SUBCUTANEOUS
Qty: 0 | Refills: 0 | COMMUNITY
Start: 2017-08-11 | End: 2017-08-25

## 2017-08-11 RX ADMIN — CELECOXIB 200 MILLIGRAM(S): 200 CAPSULE ORAL at 07:24

## 2017-08-11 RX ADMIN — Medication 5 MILLIGRAM(S): at 06:14

## 2017-08-11 RX ADMIN — GABAPENTIN 100 MILLIGRAM(S): 400 CAPSULE ORAL at 06:14

## 2017-08-11 RX ADMIN — Medication 50 MILLIGRAM(S): at 23:08

## 2017-08-11 RX ADMIN — LISINOPRIL 10 MILLIGRAM(S): 2.5 TABLET ORAL at 06:13

## 2017-08-11 RX ADMIN — Medication 100 GRAM(S): at 16:37

## 2017-08-11 RX ADMIN — Medication 1000 MILLIGRAM(S): at 07:24

## 2017-08-11 RX ADMIN — PANTOPRAZOLE SODIUM 40 MILLIGRAM(S): 20 TABLET, DELAYED RELEASE ORAL at 06:14

## 2017-08-11 RX ADMIN — Medication 10 MILLIGRAM(S): at 23:37

## 2017-08-11 RX ADMIN — AMLODIPINE BESYLATE 10 MILLIGRAM(S): 2.5 TABLET ORAL at 06:14

## 2017-08-11 RX ADMIN — ENOXAPARIN SODIUM 30 MILLIGRAM(S): 100 INJECTION SUBCUTANEOUS at 11:11

## 2017-08-11 RX ADMIN — MORPHINE SULFATE 1 MILLIGRAM(S): 50 CAPSULE, EXTENDED RELEASE ORAL at 01:58

## 2017-08-11 RX ADMIN — Medication 50 MILLIGRAM(S): at 06:13

## 2017-08-11 RX ADMIN — LIDOCAINE 1 PATCH: 4 CREAM TOPICAL at 11:09

## 2017-08-11 RX ADMIN — MORPHINE SULFATE 1 MILLIGRAM(S): 50 CAPSULE, EXTENDED RELEASE ORAL at 01:43

## 2017-08-11 NOTE — DISCHARGE NOTE ADULT - HOSPITAL COURSE
is a 89 y/o female presented w/failure to thrive and dehydration. She was very contracted and bedbound on initial presentation to the hospital. Our goals of care included hydrating her and gently mobilizing her, controlling her pain, helping her with her appetite stimulation.  She is now doing much better, and is awake, alert, and is doing much better after starting medications to control her arthritic pain and her depression. She is now awake and mentating well, will need [facility]UCENA and nursing home placement thereafter. She can f/u with her outpatient PMD but she cannot go back to her home where her ex-  was taking care of her (he's unable to take care of her further). Her health care proxy is her daughter Cristina, who lives in [place], phone number is [phone]. Her daughter confirms that her mother is a DNR/DNI and a M[facility] form was filled out here in the hospital. For now, she will need [facility]UCENA with transition to a nursing home thereafter.      as wall as her family are pleased with her care. She is now eating 70-80% of her pureed diet meals and is much more alert with labs demonstrating a resolution of dehydration. They are in agreement to [facility] with nursing home placement as a discharge plan.  is a 89 y/o female presented w/failure to thrive and dehydration. She was very contracted and bedbound on initial presentation to the hospital. Our goals of care included hydrating her and gently mobilizing her, controlling her pain, helping her with her appetite stimulation.  She is now doing much better, and is awake, alert, and is doing much better after starting medications to control her arthritic pain and her depression. She is now awake and mentating well, will need [facility]UCENA and nursing home placement thereafter. She can f/u with her outpatient PMD but she cannot go back to her home where her ex-  was taking care of her (he's unable to take care of her further). Her health care proxy is her daughter Cristina, who lives in [place], phone number is [phone]. Her daughter confirms that her mother is a DNR/DNI and a [facility] form was filled out here in the hospital. For now, she will need [facility] with transition to a nursing home thereafter.      She is now eating 50-70% of her pureed diet meals and is much more alert with labs demonstrating a resolution of dehydration. She is requiring help and encouragement regarding feeding.  They are in agreement to [facility] with nursing home placement as a discharge plan.     Pt is seen and examined, awake this morning, denied abd. pain, nausea, vomiting. Discussed with her ex  Cholo, updated   stable for discharge   Time spent 35 minutes

## 2017-08-11 NOTE — PROGRESS NOTE ADULT - SUBJECTIVE AND OBJECTIVE BOX
is a 89 y/o female presented w/failure to thrive and dehydration. She failed her speech and swallow evaluation on 08/07 but is doing much better today and is able to tolerate a pureed diet. I spoke with her daughter Cristina in [place] who confirms that her mother is a DNR/DNI.  has no capacity given her dementia and she was being taken care of by her her ex- but he is unable to provide further care for her. Her daughter agrees that she can make medical decisions for her (as her other children are not interested in participating in her care and are not interested in making any medical decisions).  is eating pureed diet today, and I've held off on the idea of peg tube for now. I am treating her pain with celecoxib, gabapentin and a lidocaine patch over her knee to help her become un-contracted. She will need to be discharged to a nursing home.     Of note, I've filled out the [facility] form and have asked nursing to confirm daughter Cristina's decision about the DNR/DNI.  is eating better today, we've increased her robinul and encouraged her to move around. We appreciate PT's help in mobilizing her. She appears less contracted and she will need AZUCENA.     Summary:   REVIEW OF SYSTEMS    General: "I'm doing okay, I'm feeling better."    Skin/Breast:  	  Ophthalmologic:  	  ENMT:	    Respiratory and Thorax:  	  Cardiovascular:	    Gastrointestinal:	    Genitourinary:	    Musculoskeletal:	    Neurological:	    Psychiatric:	    Hematology/Lymphatics:	    Endocrine:	    Allergic/Immunologic:	  Vital Signs Last 24 159/77, HR=68  PHYSICAL EXAM:  GEN: elderly female, lying in bed, resting her head on the rail  HEENT: dry MM  CVS: S1S2 RRR  PULM: CTABL, good breath sounds  ABD: soft, nontender, no rebound, no guarding  EXTREM: no edema  NEURO: difficult to assess, she's contracted in bed, has pain in the joints  PSYCH: +dementia  SKIN: warm                       12.6   3.9   )-----------( 101      ( 07 Aug 2017 07:54 )             38.5     08-07    144  |  108<H>  |  12.0  ----------------------------<  131<H>  3.4<L>   |  30.0<H>  |  0.65    Ca    9.4      07 Aug 2017 07:54  Phos  1.6     08-07  Mg     1.9     08-07    MEDICATIONS  (STANDING):  enoxaparin Injectable 30 milliGRAM(s) SubCutaneous every 24 hours  celecoxib 200 milliGRAM(s) Oral daily  lidocaine   Patch 1 Patch Transdermal daily  metoprolol 50 milliGRAM(s) Oral every 8 hours  amLODIPine   Tablet 10 milliGRAM(s) Oral daily  pantoprazole    Tablet 40 milliGRAM(s) Oral before breakfast  mirtazapine 15 milliGRAM(s) Oral at bedtime  dronabinol 5 milliGRAM(s) Oral two times a day  lisinopril 10 milliGRAM(s) Oral once  gabapentin 200 milliGRAM(s) Oral every 8 hours  potassium chloride    Tablet ER 40 milliEquivalent(s) Oral once  magnesium sulfate  IVPB 1 Gram(s) IV Intermittent once    MEDICATIONS  (PRN):  hydrALAZINE Injectable 10 milliGRAM(s) IV Push every 3 hours PRN SBP >160

## 2017-08-11 NOTE — DISCHARGE NOTE ADULT - PLAN OF CARE
Please eat all meals daily and stay hydrated, please stay in chair, and move around Please take all medications and f/u with PMD encourage po intake cont, medication as prescribed, monitor BP resolved Please eat all meals daily and stay hydrated, - need help and encouragement regarding feeding

## 2017-08-11 NOTE — DISCHARGE NOTE ADULT - CARE PLAN
Principal Discharge DX:	Failure to thrive in adult  Goal:	Please eat all meals daily and stay hydrated, please stay in chair, and move around  Instructions for follow-up, activity and diet:	Please take all medications and f/u with PMD  Secondary Diagnosis:	Dehydration  Secondary Diagnosis:	Arthritis  Secondary Diagnosis:	Mild protein-calorie malnutrition Principal Discharge DX:	Failure to thrive in adult  Goal:	Please eat all meals daily and stay hydrated, - need help and encouragement regarding feeding  Instructions for follow-up, activity and diet:	Please take all medications and f/u with PMD  Secondary Diagnosis:	Dehydration  Goal:	encourage po intake  Secondary Diagnosis:	Arthritis  Secondary Diagnosis:	Mild protein-calorie malnutrition  Secondary Diagnosis:	Essential hypertension  Instructions for follow-up, activity and diet:	cont, medication as prescribed, monitor BP  Secondary Diagnosis:	Hypokalemia  Instructions for follow-up, activity and diet:	resolved  Secondary Diagnosis:	Hypernatremia  Instructions for follow-up, activity and diet:	resolved

## 2017-08-11 NOTE — DISCHARGE NOTE ADULT - MEDICATION SUMMARY - MEDICATIONS TO TAKE
I will START or STAY ON the medications listed below when I get home from the hospital:    celecoxib 200 mg oral capsule  -- 1 cap(s) by mouth once a day  -- Indication: For Arthritis    lisinopril 20 mg oral tablet  -- 1 tab(s) by mouth once a day  -- Indication: For htn    enoxaparin  -- 30 milligram(s) subcutaneous once a day; DVT prophylaxis while she's bedbound, x2 more weeks  -- Indication: For Dvt prophylaxis while bedbound    gabapentin 100 mg oral capsule  -- 2 cap(s) by mouth every 8 hours  -- Indication: For Arthritis    mirtazapine 15 mg oral tablet  -- 1 tab(s) by mouth once a day (at bedtime)  -- Indication: For Appetite stimulant/depression    traZODone 50 mg oral tablet  -- 1 tab(s) by mouth once a day (at bedtime)  -- Indication: For insomnia    dronabinol 5 mg oral capsule  -- 1 cap(s) by mouth 2 times a day  -- Indication: For Appetite stimulant    amLODIPine 10 mg oral tablet  -- 1 tab(s) by mouth once a day  -- Indication: For htn    lidocaine 5% topical film  -- Apply on skin to affected area once a day; over right knee  -- Indication: For Arthritis    pantoprazole 40 mg oral delayed release tablet  -- 1 tab(s) by mouth once a day (before a meal)  -- Indication: For gerd I will START or STAY ON the medications listed below when I get home from the hospital:    celecoxib 200 mg oral capsule  -- 1 cap(s) by mouth once a day  -- Indication: For Arthritis    lisinopril 20 mg oral tablet  -- 1 tab(s) by mouth once a day  -- Indication: For htn    enoxaparin  -- 30 milligram(s) subcutaneous once a day; DVT prophylaxis while she's bedbound, x2 more weeks  -- Indication: For Dvt prophylaxis while bedbound    mirtazapine 15 mg oral tablet  -- 1 tab(s) by mouth once a day (at bedtime)  -- Indication: For Appetite stimulant/depression    dronabinol 5 mg oral capsule  -- 1 cap(s) by mouth 2 times a day  -- Indication: For Appetite stimulant    Lopressor 100 mg oral tablet  -- 1 tab(s) by mouth once a day  -- It is very important that you take or use this exactly as directed.  Do not skip doses or discontinue unless directed by your doctor.  May cause drowsiness.  Alcohol may intensify this effect.  Use care when operating dangerous machinery.  Some non-prescription drugs may aggravate your condition.  Read all labels carefully.  If a warning appears, check with your doctor before taking.  Take with food or milk.  This drug may impair the ability to drive or operate machinery.  Use care until you become familiar with its effects.    -- Indication: For Htn    amLODIPine 10 mg oral tablet  -- 1 tab(s) by mouth once a day  -- Indication: For htn    lidocaine 5% topical film  -- Apply on skin to affected area once a day; over right knee  -- Indication: For Arthritis    pantoprazole 40 mg oral delayed release tablet  -- 1 tab(s) by mouth once a day (before a meal)  -- Indication: For gerd

## 2017-08-11 NOTE — DISCHARGE NOTE ADULT - SECONDARY DIAGNOSIS.
Dehydration Arthritis Mild protein-calorie malnutrition Essential hypertension Hypokalemia Hypernatremia

## 2017-08-11 NOTE — DISCHARGE NOTE ADULT - MEDICATION SUMMARY - MEDICATIONS TO CHANGE
I will SWITCH the dose or number of times a day I take the medications listed below when I get home from the hospital:    amLODIPine 5 mg oral tablet  --  by mouth

## 2017-08-12 DIAGNOSIS — E87.0 HYPEROSMOLALITY AND HYPERNATREMIA: ICD-10-CM

## 2017-08-12 DIAGNOSIS — E83.39 OTHER DISORDERS OF PHOSPHORUS METABOLISM: ICD-10-CM

## 2017-08-12 DIAGNOSIS — E87.6 HYPOKALEMIA: ICD-10-CM

## 2017-08-12 LAB
ANION GAP SERPL CALC-SCNC: 11 MMOL/L — SIGNIFICANT CHANGE UP (ref 5–17)
BUN SERPL-MCNC: 14 MG/DL — SIGNIFICANT CHANGE UP (ref 8–20)
CALCIUM SERPL-MCNC: 9.7 MG/DL — SIGNIFICANT CHANGE UP (ref 8.6–10.2)
CHLORIDE SERPL-SCNC: 106 MMOL/L — SIGNIFICANT CHANGE UP (ref 98–107)
CO2 SERPL-SCNC: 29 MMOL/L — SIGNIFICANT CHANGE UP (ref 22–29)
CREAT SERPL-MCNC: 0.67 MG/DL — SIGNIFICANT CHANGE UP (ref 0.5–1.3)
GLUCOSE SERPL-MCNC: 109 MG/DL — SIGNIFICANT CHANGE UP (ref 70–115)
HCT VFR BLD CALC: 39.3 % — SIGNIFICANT CHANGE UP (ref 37–47)
HGB BLD-MCNC: 12.7 G/DL — SIGNIFICANT CHANGE UP (ref 12–16)
MAGNESIUM SERPL-MCNC: 2.1 MG/DL — SIGNIFICANT CHANGE UP (ref 1.6–2.6)
MCHC RBC-ENTMCNC: 31.3 PG — HIGH (ref 27–31)
MCHC RBC-ENTMCNC: 32.3 G/DL — SIGNIFICANT CHANGE UP (ref 32–36)
MCV RBC AUTO: 96.8 FL — SIGNIFICANT CHANGE UP (ref 81–99)
PHOSPHATE SERPL-MCNC: 2.3 MG/DL — LOW (ref 2.4–4.7)
PLATELET # BLD AUTO: 185 K/UL — SIGNIFICANT CHANGE UP (ref 150–400)
POTASSIUM SERPL-MCNC: 3.2 MMOL/L — LOW (ref 3.5–5.3)
POTASSIUM SERPL-SCNC: 3.2 MMOL/L — LOW (ref 3.5–5.3)
RBC # BLD: 4.06 M/UL — LOW (ref 4.4–5.2)
RBC # FLD: 15.5 % — SIGNIFICANT CHANGE UP (ref 11–15.6)
SODIUM SERPL-SCNC: 146 MMOL/L — HIGH (ref 135–145)
WBC # BLD: 5.1 K/UL — SIGNIFICANT CHANGE UP (ref 4.8–10.8)
WBC # FLD AUTO: 5.1 K/UL — SIGNIFICANT CHANGE UP (ref 4.8–10.8)

## 2017-08-12 PROCEDURE — 99233 SBSQ HOSP IP/OBS HIGH 50: CPT

## 2017-08-12 RX ORDER — ACETAMINOPHEN 500 MG
650 TABLET ORAL EVERY 6 HOURS
Qty: 0 | Refills: 0 | Status: DISCONTINUED | OUTPATIENT
Start: 2017-08-12 | End: 2017-08-16

## 2017-08-12 RX ORDER — POTASSIUM CHLORIDE 20 MEQ
40 PACKET (EA) ORAL ONCE
Qty: 0 | Refills: 0 | Status: COMPLETED | OUTPATIENT
Start: 2017-08-12 | End: 2017-08-12

## 2017-08-12 RX ORDER — SODIUM,POTASSIUM PHOSPHATES 278-250MG
1 POWDER IN PACKET (EA) ORAL
Qty: 0 | Refills: 0 | Status: COMPLETED | OUTPATIENT
Start: 2017-08-12 | End: 2017-08-15

## 2017-08-12 RX ADMIN — Medication 50 MILLIGRAM(S): at 06:11

## 2017-08-12 RX ADMIN — CELECOXIB 200 MILLIGRAM(S): 200 CAPSULE ORAL at 12:08

## 2017-08-12 RX ADMIN — Medication 5 MILLIGRAM(S): at 06:10

## 2017-08-12 RX ADMIN — MIRTAZAPINE 15 MILLIGRAM(S): 45 TABLET, ORALLY DISINTEGRATING ORAL at 21:56

## 2017-08-12 RX ADMIN — Medication 1 TABLET(S): at 17:53

## 2017-08-12 RX ADMIN — Medication 40 MILLIEQUIVALENT(S): at 12:12

## 2017-08-12 RX ADMIN — Medication 50 MILLIGRAM(S): at 21:56

## 2017-08-12 RX ADMIN — GABAPENTIN 200 MILLIGRAM(S): 400 CAPSULE ORAL at 21:56

## 2017-08-12 RX ADMIN — Medication 1 TABLET(S): at 21:56

## 2017-08-12 RX ADMIN — Medication 50 MILLIGRAM(S): at 17:53

## 2017-08-12 RX ADMIN — LIDOCAINE 1 PATCH: 4 CREAM TOPICAL at 06:48

## 2017-08-12 RX ADMIN — Medication 5 MILLIGRAM(S): at 18:07

## 2017-08-12 RX ADMIN — LISINOPRIL 20 MILLIGRAM(S): 2.5 TABLET ORAL at 06:11

## 2017-08-12 RX ADMIN — ENOXAPARIN SODIUM 30 MILLIGRAM(S): 100 INJECTION SUBCUTANEOUS at 12:08

## 2017-08-12 RX ADMIN — AMLODIPINE BESYLATE 10 MILLIGRAM(S): 2.5 TABLET ORAL at 06:11

## 2017-08-12 RX ADMIN — GABAPENTIN 200 MILLIGRAM(S): 400 CAPSULE ORAL at 18:07

## 2017-08-12 RX ADMIN — CELECOXIB 200 MILLIGRAM(S): 200 CAPSULE ORAL at 13:00

## 2017-08-12 RX ADMIN — GABAPENTIN 200 MILLIGRAM(S): 400 CAPSULE ORAL at 06:11

## 2017-08-12 RX ADMIN — Medication 1 TABLET(S): at 18:08

## 2017-08-12 RX ADMIN — PANTOPRAZOLE SODIUM 40 MILLIGRAM(S): 20 TABLET, DELAYED RELEASE ORAL at 06:11

## 2017-08-12 RX ADMIN — LIDOCAINE 1 PATCH: 4 CREAM TOPICAL at 12:08

## 2017-08-12 NOTE — PROGRESS NOTE ADULT - ASSESSMENT
is a 89 y/o female presented w/failure to thrive and dehydration. She failed her speech and swallow evaluation on 08/07 but is doing much better now and is able to tolerate a pureed diet. Dr. Banerjee spoke with her daughter Cristina in [place] who confirms that her mother is a DNR/DNI.  has no capacity given her dementia and she was being taken care of by her her ex- but he is unable to provide further care for her. Her daughter agrees that she can make medical decisions for her (as her other children are not interested in participating in her care and are not interested in making any medical decisions).  is eating pureed diet today, and I've held off on the idea of peg tube for now. She is being treating her pain with celecoxib, gabapentin and a lidocaine patch over her knee to help her become un-contracted. She will need placement     Of note, Dr. Chriss seo filled out the [facility] form and have asked nursing to confirm daughter Cristina's decision about the DNR/DNI.  is eating better today, we've increased her robinul and encouraged her to move around. We appreciate PT's help in mobilizing her. She appears less contracted and she will need AZUCENA.

## 2017-08-12 NOTE — PROGRESS NOTE ADULT - SUBJECTIVE AND OBJECTIVE BOX
Internal Medicine Hospitalist - Dr. Brittany GILBERT    [id number]    89y      Female    Patient is a 89y old  Female who presents with a chief complaint of BIBA for weakness (11 Aug 2017 11:12)    INTERVAL HPI/ OVERNIGHT EVENTS: Patient is seen and examined, pt report mild to moderate joint pain, denied fever, chills, abd. pain, nausea and vomiting.     REVIEW OF SYSTEMS:    Denied fever, chills, abd. pain, nausea, vomiting, chest pain, SOB, headache, dizziness    PHYSICAL EXAM:    Vital Signs Last 24 Hrs  T(C): 36.7 (12 Aug 2017 07:34), Max: 36.9 (11 Aug 2017 23:17)  T(F): 98 (12 Aug 2017 07:34), Max: 98.4 (11 Aug 2017 23:17)  HR: 79 (12 Aug 2017 07:34) (70 - 79)  BP: 142/82 (12 Aug 2017 07:34) (110/52 - 186/85)  BP(mean): --  RR: 20 (12 Aug 2017 07:34) (18 - 20)  SpO2: 96% (11 Aug 2017 15:45) (96% - 96%)    GENERAL: NAD  HEENT: EOMI, dry oral mucosa   Neck: supple  CHEST/LUNG: positive air entry   HEART: S1S2+ audible  ABDOMEN: Soft, Nontender, Nondistended; Bowel sounds present  EXTREMITIES:  no edema  CNS: AAO X 3  Psychiatry: normal mood    LABS:                        12.7   5.1   )-----------( 185      ( 12 Aug 2017 08:26 )             39.3     08-12    146<H>  |  106  |  14.0  ----------------------------<  109  3.2<L>   |  29.0  |  0.67    Ca    9.7      12 Aug 2017 08:26  Phos  2.3     08-12  Mg     2.1     08-12      MEDICATIONS  (STANDING):  enoxaparin Injectable 30 milliGRAM(s) SubCutaneous every 24 hours  celecoxib 200 milliGRAM(s) Oral daily  lidocaine   Patch 1 Patch Transdermal daily  metoprolol 50 milliGRAM(s) Oral every 8 hours  amLODIPine   Tablet 10 milliGRAM(s) Oral daily  pantoprazole    Tablet 40 milliGRAM(s) Oral before breakfast  mirtazapine 15 milliGRAM(s) Oral at bedtime  dronabinol 5 milliGRAM(s) Oral two times a day  lisinopril 20 milliGRAM(s) Oral daily  gabapentin 200 milliGRAM(s) Oral every 8 hours  potassium chloride    Tablet ER 40 milliEquivalent(s) Oral once  potassium acid phosphate/sodium acid phosphate tablet (K-PHOS No. 2) 1 Tablet(s) Oral four times a day with meals    MEDICATIONS  (PRN):  hydrALAZINE Injectable 10 milliGRAM(s) IV Push every 3 hours PRN SBP >160      RADIOLOGY & ADDITIONAL TEST

## 2017-08-13 LAB
ANION GAP SERPL CALC-SCNC: 13 MMOL/L — SIGNIFICANT CHANGE UP (ref 5–17)
BUN SERPL-MCNC: 11 MG/DL — SIGNIFICANT CHANGE UP (ref 8–20)
CALCIUM SERPL-MCNC: 9.6 MG/DL — SIGNIFICANT CHANGE UP (ref 8.6–10.2)
CHLORIDE SERPL-SCNC: 105 MMOL/L — SIGNIFICANT CHANGE UP (ref 98–107)
CO2 SERPL-SCNC: 29 MMOL/L — SIGNIFICANT CHANGE UP (ref 22–29)
CREAT SERPL-MCNC: 0.62 MG/DL — SIGNIFICANT CHANGE UP (ref 0.5–1.3)
GLUCOSE SERPL-MCNC: 110 MG/DL — SIGNIFICANT CHANGE UP (ref 70–115)
PHOSPHATE SERPL-MCNC: 2 MG/DL — LOW (ref 2.4–4.7)
POTASSIUM SERPL-MCNC: 3.2 MMOL/L — LOW (ref 3.5–5.3)
POTASSIUM SERPL-SCNC: 3.2 MMOL/L — LOW (ref 3.5–5.3)
SODIUM SERPL-SCNC: 147 MMOL/L — HIGH (ref 135–145)

## 2017-08-13 PROCEDURE — 99233 SBSQ HOSP IP/OBS HIGH 50: CPT

## 2017-08-13 RX ORDER — POTASSIUM CHLORIDE 20 MEQ
40 PACKET (EA) ORAL ONCE
Qty: 0 | Refills: 0 | Status: COMPLETED | OUTPATIENT
Start: 2017-08-13 | End: 2017-08-13

## 2017-08-13 RX ORDER — SODIUM CHLORIDE 9 MG/ML
1000 INJECTION, SOLUTION INTRAVENOUS
Qty: 0 | Refills: 0 | Status: DISCONTINUED | OUTPATIENT
Start: 2017-08-13 | End: 2017-08-14

## 2017-08-13 RX ADMIN — CELECOXIB 200 MILLIGRAM(S): 200 CAPSULE ORAL at 11:01

## 2017-08-13 RX ADMIN — Medication 1 TABLET(S): at 21:19

## 2017-08-13 RX ADMIN — Medication 50 MILLIGRAM(S): at 05:26

## 2017-08-13 RX ADMIN — GABAPENTIN 200 MILLIGRAM(S): 400 CAPSULE ORAL at 05:27

## 2017-08-13 RX ADMIN — Medication 1 TABLET(S): at 17:04

## 2017-08-13 RX ADMIN — LIDOCAINE 1 PATCH: 4 CREAM TOPICAL at 11:01

## 2017-08-13 RX ADMIN — Medication 1 TABLET(S): at 11:02

## 2017-08-13 RX ADMIN — Medication 5 MILLIGRAM(S): at 05:26

## 2017-08-13 RX ADMIN — GABAPENTIN 200 MILLIGRAM(S): 400 CAPSULE ORAL at 17:03

## 2017-08-13 RX ADMIN — Medication 50 MILLIGRAM(S): at 21:20

## 2017-08-13 RX ADMIN — MIRTAZAPINE 15 MILLIGRAM(S): 45 TABLET, ORALLY DISINTEGRATING ORAL at 21:20

## 2017-08-13 RX ADMIN — ENOXAPARIN SODIUM 30 MILLIGRAM(S): 100 INJECTION SUBCUTANEOUS at 11:01

## 2017-08-13 RX ADMIN — PANTOPRAZOLE SODIUM 40 MILLIGRAM(S): 20 TABLET, DELAYED RELEASE ORAL at 05:26

## 2017-08-13 RX ADMIN — LISINOPRIL 20 MILLIGRAM(S): 2.5 TABLET ORAL at 05:27

## 2017-08-13 RX ADMIN — GABAPENTIN 200 MILLIGRAM(S): 400 CAPSULE ORAL at 21:19

## 2017-08-13 RX ADMIN — Medication 5 MILLIGRAM(S): at 17:03

## 2017-08-13 RX ADMIN — AMLODIPINE BESYLATE 10 MILLIGRAM(S): 2.5 TABLET ORAL at 05:27

## 2017-08-13 RX ADMIN — Medication 40 MILLIEQUIVALENT(S): at 17:04

## 2017-08-13 RX ADMIN — CELECOXIB 200 MILLIGRAM(S): 200 CAPSULE ORAL at 13:43

## 2017-08-13 RX ADMIN — Medication 50 MILLIGRAM(S): at 11:02

## 2017-08-13 RX ADMIN — LIDOCAINE 1 PATCH: 4 CREAM TOPICAL at 23:00

## 2017-08-13 NOTE — PROGRESS NOTE ADULT - SUBJECTIVE AND OBJECTIVE BOX
Internal Medicine Hospitalist - Dr. Brittany GILBERT    [id number]    89y      Female    Patient is a 89y old  Female who presents with a chief complaint of BIBA for weakness (11 Aug 2017 11:12)    INTERVAL HPI/ OVERNIGHT EVENTS: Patient is seen and examined, pt is awake, confused     REVIEW OF SYSTEMS:    Denied fever, chills, abd. pain, nausea, vomiting, chest pain, SOB    PHYSICAL EXAM:    Vital Signs Last 24 Hrs  T(C): 36.8 (13 Aug 2017 00:50), Max: 36.8 (13 Aug 2017 00:50)  T(F): 98.3 (13 Aug 2017 00:50), Max: 98.3 (13 Aug 2017 00:50)  HR: 74 (13 Aug 2017 00:50) (66 - 74)  BP: 148/68 (13 Aug 2017 00:50) (148/68 - 152/67)  BP(mean): --  RR: 18 (13 Aug 2017 00:50) (18 - 18)  SpO2: 97% (12 Aug 2017 17:16) (97% - 97%)    GENERAL: NAD  HEENT: EOMI  Neck: supple  CHEST/LUNG: positive air entry   HEART: S1S2+ audible  ABDOMEN: Soft, Nontender, Nondistended; Bowel sounds present  EXTREMITIES:  no edema  CNS: Awake confused   Psychiatry: normal mood    LABS:                        12.7   5.1   )-----------( 185      ( 12 Aug 2017 08:26 )             39.3     08-13    147<H>  |  105  |  11.0  ----------------------------<  110  3.2<L>   |  29.0  |  0.62    Ca    9.6      13 Aug 2017 08:55  Phos  2.0     08-13  Mg     2.1     08-12              MEDICATIONS  (STANDING):  enoxaparin Injectable 30 milliGRAM(s) SubCutaneous every 24 hours  celecoxib 200 milliGRAM(s) Oral daily  lidocaine   Patch 1 Patch Transdermal daily  metoprolol 50 milliGRAM(s) Oral every 8 hours  amLODIPine   Tablet 10 milliGRAM(s) Oral daily  pantoprazole    Tablet 40 milliGRAM(s) Oral before breakfast  mirtazapine 15 milliGRAM(s) Oral at bedtime  dronabinol 5 milliGRAM(s) Oral two times a day  lisinopril 20 milliGRAM(s) Oral daily  gabapentin 200 milliGRAM(s) Oral every 8 hours  potassium acid phosphate/sodium acid phosphate tablet (K-PHOS No. 2) 1 Tablet(s) Oral four times a day with meals  potassium chloride    Tablet ER 40 milliEquivalent(s) Oral once  sodium chloride 0.45%. 1000 milliLiter(s) (75 mL/Hr) IV Continuous <Continuous>    MEDICATIONS  (PRN):  hydrALAZINE Injectable 10 milliGRAM(s) IV Push every 3 hours PRN SBP >160  acetaminophen   Tablet. 650 milliGRAM(s) Oral every 6 hours PRN Mild Pain (1 - 3)      RADIOLOGY & ADDITIONAL TEST

## 2017-08-13 NOTE — PROGRESS NOTE ADULT - ASSESSMENT
is a 89 y/o female presented w/failure to thrive and dehydration. She failed her speech and swallow evaluation on 08/07 but is doing much better now and is able to tolerate a pureed diet. Dr. Banerjee spoke with her daughter Cristina in [place] who confirms that her mother is a DNR/DNI.  has no capacity given her dementia and she was being taken care of by her her ex- but he is unable to provide further care for her. Her daughter agrees that she can make medical decisions for her (as her other children are not interested in participating in her care and are not interested in making any medical decisions).  is eating pureed diet today, and I've held off on the idea of peg tube for now. She is being treating her pain with celecoxib, gabapentin and a lidocaine patch over her knee to help her become un-contracted. She will need placement     Of note, Dr. Chriss seo filled out the [facility] form and have asked nursing to confirm daughter Cristina's decision about the DNR/DNI.  is eating better today, we've increased her robinul and encouraged her to move around. We appreciate PT's help in mobilizing her. She appears less contracted and she will need AZUCENA.

## 2017-08-14 LAB
ANION GAP SERPL CALC-SCNC: 15 MMOL/L — SIGNIFICANT CHANGE UP (ref 5–17)
BUN SERPL-MCNC: 7 MG/DL — LOW (ref 8–20)
CALCIUM SERPL-MCNC: 9.4 MG/DL — SIGNIFICANT CHANGE UP (ref 8.6–10.2)
CHLORIDE SERPL-SCNC: 105 MMOL/L — SIGNIFICANT CHANGE UP (ref 98–107)
CO2 SERPL-SCNC: 26 MMOL/L — SIGNIFICANT CHANGE UP (ref 22–29)
CREAT SERPL-MCNC: 0.49 MG/DL — LOW (ref 0.5–1.3)
GLUCOSE SERPL-MCNC: 123 MG/DL — HIGH (ref 70–115)
MAGNESIUM SERPL-MCNC: 1.8 MG/DL — SIGNIFICANT CHANGE UP (ref 1.6–2.6)
PHOSPHATE SERPL-MCNC: 2.5 MG/DL — SIGNIFICANT CHANGE UP (ref 2.4–4.7)
POTASSIUM SERPL-MCNC: 3 MMOL/L — LOW (ref 3.5–5.3)
POTASSIUM SERPL-SCNC: 3 MMOL/L — LOW (ref 3.5–5.3)
SODIUM SERPL-SCNC: 146 MMOL/L — HIGH (ref 135–145)

## 2017-08-14 PROCEDURE — 99233 SBSQ HOSP IP/OBS HIGH 50: CPT

## 2017-08-14 RX ORDER — DEXTROSE MONOHYDRATE, SODIUM CHLORIDE, AND POTASSIUM CHLORIDE 50; .745; 4.5 G/1000ML; G/1000ML; G/1000ML
1000 INJECTION, SOLUTION INTRAVENOUS
Qty: 0 | Refills: 0 | Status: DISCONTINUED | OUTPATIENT
Start: 2017-08-14 | End: 2017-08-15

## 2017-08-14 RX ORDER — POTASSIUM CHLORIDE 20 MEQ
40 PACKET (EA) ORAL EVERY 4 HOURS
Qty: 0 | Refills: 0 | Status: COMPLETED | OUTPATIENT
Start: 2017-08-14 | End: 2017-08-14

## 2017-08-14 RX ORDER — MAGNESIUM SULFATE 500 MG/ML
1 VIAL (ML) INJECTION ONCE
Qty: 0 | Refills: 0 | Status: COMPLETED | OUTPATIENT
Start: 2017-08-14 | End: 2017-08-14

## 2017-08-14 RX ADMIN — Medication 50 MILLIGRAM(S): at 14:48

## 2017-08-14 RX ADMIN — LISINOPRIL 20 MILLIGRAM(S): 2.5 TABLET ORAL at 05:34

## 2017-08-14 RX ADMIN — MIRTAZAPINE 15 MILLIGRAM(S): 45 TABLET, ORALLY DISINTEGRATING ORAL at 22:56

## 2017-08-14 RX ADMIN — Medication 100 GRAM(S): at 12:03

## 2017-08-14 RX ADMIN — Medication 40 MILLIEQUIVALENT(S): at 11:04

## 2017-08-14 RX ADMIN — Medication 5 MILLIGRAM(S): at 17:35

## 2017-08-14 RX ADMIN — Medication 1 TABLET(S): at 22:56

## 2017-08-14 RX ADMIN — Medication 1 TABLET(S): at 14:48

## 2017-08-14 RX ADMIN — CELECOXIB 200 MILLIGRAM(S): 200 CAPSULE ORAL at 11:04

## 2017-08-14 RX ADMIN — Medication 50 MILLIGRAM(S): at 22:56

## 2017-08-14 RX ADMIN — Medication 1 TABLET(S): at 17:36

## 2017-08-14 RX ADMIN — Medication 5 MILLIGRAM(S): at 05:44

## 2017-08-14 RX ADMIN — Medication 40 MILLIEQUIVALENT(S): at 14:49

## 2017-08-14 RX ADMIN — CELECOXIB 200 MILLIGRAM(S): 200 CAPSULE ORAL at 12:03

## 2017-08-14 RX ADMIN — LIDOCAINE 1 PATCH: 4 CREAM TOPICAL at 12:03

## 2017-08-14 RX ADMIN — Medication 50 MILLIGRAM(S): at 05:33

## 2017-08-14 RX ADMIN — AMLODIPINE BESYLATE 10 MILLIGRAM(S): 2.5 TABLET ORAL at 05:34

## 2017-08-14 RX ADMIN — DEXTROSE MONOHYDRATE, SODIUM CHLORIDE, AND POTASSIUM CHLORIDE 75 MILLILITER(S): 50; .745; 4.5 INJECTION, SOLUTION INTRAVENOUS at 12:00

## 2017-08-14 RX ADMIN — PANTOPRAZOLE SODIUM 40 MILLIGRAM(S): 20 TABLET, DELAYED RELEASE ORAL at 07:00

## 2017-08-14 RX ADMIN — GABAPENTIN 200 MILLIGRAM(S): 400 CAPSULE ORAL at 14:48

## 2017-08-14 RX ADMIN — GABAPENTIN 200 MILLIGRAM(S): 400 CAPSULE ORAL at 05:34

## 2017-08-14 RX ADMIN — ENOXAPARIN SODIUM 30 MILLIGRAM(S): 100 INJECTION SUBCUTANEOUS at 11:04

## 2017-08-14 NOTE — PROGRESS NOTE ADULT - SUBJECTIVE AND OBJECTIVE BOX
Internal Medicine Hospitalist - Dr. Brittany GILBERT    [id number]    89y      Female    Patient is a 89y old  Female who presents with a chief complaint of BIBA for weakness (11 Aug 2017 11:12)    INTERVAL HPI/ OVERNIGHT EVENTS: Patient is seen and examined, pt is sleeping this morning, no new event overnight     REVIEW OF SYSTEMS:    unable to obtain     PHYSICAL EXAM:    Vital Signs Last 24 Hrs  T(C): 37.2 (14 Aug 2017 00:10), Max: 37.2 (14 Aug 2017 00:10)  T(F): 98.9 (14 Aug 2017 00:10), Max: 98.9 (14 Aug 2017 00:10)  HR: 69 (14 Aug 2017 00:10) (69 - 69)  BP: 159/84 (14 Aug 2017 00:10) (159/84 - 159/84)  BP(mean): --  RR: 18 (14 Aug 2017 00:10) (18 - 18)  SpO2: 96% (14 Aug 2017 00:10) (96% - 96%)    GENERAL: sleeping   Neck: supple  Chest - positive air entry   HEART: S1S2+ audible  ABDOMEN: Soft, Nontender, Nondistended; Bowel sounds present  EXTREMITIES:  no edema  CNS: sleeping     LABS:    08-14    146<H>  |  105  |  7.0<L>  ----------------------------<  123<H>  3.0<L>   |  26.0  |  0.49<L>    Ca    9.4      14 Aug 2017 07:32  Phos  2.5     08-14  Mg     1.8     08-14      MEDICATIONS  (STANDING):  enoxaparin Injectable 30 milliGRAM(s) SubCutaneous every 24 hours  celecoxib 200 milliGRAM(s) Oral daily  lidocaine   Patch 1 Patch Transdermal daily  metoprolol 50 milliGRAM(s) Oral every 8 hours  amLODIPine   Tablet 10 milliGRAM(s) Oral daily  pantoprazole    Tablet 40 milliGRAM(s) Oral before breakfast  mirtazapine 15 milliGRAM(s) Oral at bedtime  dronabinol 5 milliGRAM(s) Oral two times a day  lisinopril 20 milliGRAM(s) Oral daily  gabapentin 200 milliGRAM(s) Oral every 8 hours  potassium acid phosphate/sodium acid phosphate tablet (K-PHOS No. 2) 1 Tablet(s) Oral four times a day with meals  sodium chloride 0.45% with potassium chloride 20 mEq/L 1000 milliLiter(s) (75 mL/Hr) IV Continuous <Continuous>  potassium chloride    Tablet ER 40 milliEquivalent(s) Oral every 4 hours  magnesium sulfate  IVPB 1 Gram(s) IV Intermittent once    MEDICATIONS  (PRN):  hydrALAZINE Injectable 10 milliGRAM(s) IV Push every 3 hours PRN SBP >160  acetaminophen   Tablet. 650 milliGRAM(s) Oral every 6 hours PRN Mild Pain (1 - 3)      RADIOLOGY & ADDITIONAL TEST

## 2017-08-15 LAB
ANION GAP SERPL CALC-SCNC: 8 MMOL/L — SIGNIFICANT CHANGE UP (ref 5–17)
BUN SERPL-MCNC: 13 MG/DL — SIGNIFICANT CHANGE UP (ref 8–20)
CALCIUM SERPL-MCNC: 9.3 MG/DL — SIGNIFICANT CHANGE UP (ref 8.6–10.2)
CHLORIDE SERPL-SCNC: 107 MMOL/L — SIGNIFICANT CHANGE UP (ref 98–107)
CO2 SERPL-SCNC: 27 MMOL/L — SIGNIFICANT CHANGE UP (ref 22–29)
CREAT SERPL-MCNC: 0.7 MG/DL — SIGNIFICANT CHANGE UP (ref 0.5–1.3)
GLUCOSE SERPL-MCNC: 119 MG/DL — HIGH (ref 70–115)
MAGNESIUM SERPL-MCNC: 2.1 MG/DL — SIGNIFICANT CHANGE UP (ref 1.6–2.6)
POTASSIUM SERPL-MCNC: 4.7 MMOL/L — SIGNIFICANT CHANGE UP (ref 3.5–5.3)
POTASSIUM SERPL-SCNC: 4.7 MMOL/L — SIGNIFICANT CHANGE UP (ref 3.5–5.3)
SODIUM SERPL-SCNC: 142 MMOL/L — SIGNIFICANT CHANGE UP (ref 135–145)

## 2017-08-15 PROCEDURE — 99233 SBSQ HOSP IP/OBS HIGH 50: CPT

## 2017-08-15 RX ORDER — SODIUM CHLORIDE 9 MG/ML
1000 INJECTION, SOLUTION INTRAVENOUS
Qty: 0 | Refills: 0 | Status: DISCONTINUED | OUTPATIENT
Start: 2017-08-15 | End: 2017-08-16

## 2017-08-15 RX ADMIN — PANTOPRAZOLE SODIUM 40 MILLIGRAM(S): 20 TABLET, DELAYED RELEASE ORAL at 05:15

## 2017-08-15 RX ADMIN — AMLODIPINE BESYLATE 10 MILLIGRAM(S): 2.5 TABLET ORAL at 05:15

## 2017-08-15 RX ADMIN — LIDOCAINE 1 PATCH: 4 CREAM TOPICAL at 00:27

## 2017-08-15 RX ADMIN — GABAPENTIN 200 MILLIGRAM(S): 400 CAPSULE ORAL at 05:14

## 2017-08-15 RX ADMIN — Medication 5 MILLIGRAM(S): at 05:15

## 2017-08-15 RX ADMIN — LIDOCAINE 1 PATCH: 4 CREAM TOPICAL at 13:20

## 2017-08-15 RX ADMIN — DEXTROSE MONOHYDRATE, SODIUM CHLORIDE, AND POTASSIUM CHLORIDE 75 MILLILITER(S): 50; .745; 4.5 INJECTION, SOLUTION INTRAVENOUS at 02:08

## 2017-08-15 RX ADMIN — Medication 10 MILLIGRAM(S): at 23:45

## 2017-08-15 RX ADMIN — LISINOPRIL 20 MILLIGRAM(S): 2.5 TABLET ORAL at 05:14

## 2017-08-15 RX ADMIN — Medication 50 MILLIGRAM(S): at 05:15

## 2017-08-15 RX ADMIN — Medication 1 TABLET(S): at 13:19

## 2017-08-15 RX ADMIN — ENOXAPARIN SODIUM 30 MILLIGRAM(S): 100 INJECTION SUBCUTANEOUS at 13:20

## 2017-08-15 RX ADMIN — Medication 5 MILLIGRAM(S): at 17:39

## 2017-08-15 RX ADMIN — GABAPENTIN 200 MILLIGRAM(S): 400 CAPSULE ORAL at 00:27

## 2017-08-15 NOTE — PROGRESS NOTE ADULT - PROBLEM SELECTOR PLAN 1
-encourage po fluids  -IVF d/cd
-unclear etiology, may be severely dehydrated  -ECHO ordered
appetitive improving, encourage po intake, need encouragement   f/u bmp  gentle hydration
pt eating ok, on exam tongue is dry, encourage po intake, need encouragement   f/u bmp
waxing and waning mental status, encourage po intake, need encouragement   f/u bmp  cont. IVF
waxing and waning mental status, encourage po intake, need encouragement   f/u bmp pending   cont. IVF
-IVF
-unclear etiology, may be severely dehydrated  -ECHO ordered
as above

## 2017-08-15 NOTE — PROGRESS NOTE ADULT - PROBLEM SELECTOR PROBLEM 1
Dehydration
Weakness
Dehydration
Mild protein-calorie malnutrition
Weakness

## 2017-08-15 NOTE — PROGRESS NOTE ADULT - PROBLEM SELECTOR PLAN 2
-cont IVF  -failed speech and swallow  -CT head is negative for a stroke  -send TSH
-she's now able to tolerate pureed diet  -she's a DNR/DNI, confirmed with phone call from her daughter Cristina (number is in the provider handoff and d/c note)  -she's eating with the addition of robinul and remeron
-she's now able to tolerate pureed diet  -she's a DNR/DNI, confirmed with phone call from her daughter Cristina today am
-she's now able to tolerate pureed diet  -she's a DNR/DNI, confirmed with phone call from her daughter Cristina today am  -she's eating with the addition of robinul and remeron
K 3.0, KCL 40meq x 2 doses and mag IV  IVF with potassium
K 3.0, KCL 40meq x 2 doses and mag IV  IVF with potassium  labs pending
-GI consult placed for PEG tube  -called her daughter Cristina and left a message, no one is calling me back  -she would benefit from DNR/DNI but does not have capacity to make that decision
-cont IVF  -start remeron when she's able to take po meds  -CT head is negative for a stroke  -send TSH
as above

## 2017-08-15 NOTE — PROGRESS NOTE ADULT - PROBLEM SELECTOR PLAN 6
- Bp is stable   -cont norvasc/metoprolol, lisinopril to 20mg Qdaily today, can increase further if BPs not under control  -d/c on toprol 150mg Qdaily  -cont norvasc 10mg Qdaily
- Bp is stable   -cont norvasc/metoprolol, lisinopril to 20mg Qdaily today, can increase further if BPs not under control  -d/c on toprol 150mg Qdaily  -cont norvasc 10mg Qdaily
gentle hydration, f/u bmp
encourage po intake, f/u BMP in am

## 2017-08-15 NOTE — PROGRESS NOTE ADULT - PROBLEM SELECTOR PLAN 3
-contracted and has a lot of pain when moving  -cont. celecoxib, gabapentin and lidocaine patch an tylenol on her knee for pain control  -will need nursing home placement/AZUCENA
-contracted and has a lot of pain when moving  -cont. celecoxib, gabapentin and lidocaine patch an tylenol on her knee for pain control  -will need nursing home placement/AZUCENA
-contracted and has a lot of pain when moving  -started celecoxib, gabapentin and lidocaine patch on her knee for pain control  -will need nursing home placement/AZUCENA
-contracted and has a lot of pain when moving  -started celecoxib, gabapentin and lidocaine patch on her knee for pain control  -will need nursing home placement/AZUCENA
-hydralazine and labetalol written as IV, as she's currently not taking a lot of po
gentle hydration, f/u bmp
gentle hydration, f/u bmp pending
-hydralazine and labetalol written as IV, as she's currently not taking a lot of po
as above
-contracted and has a lot of pain when moving  -started celecoxib, gabapentin and lidocaine patch on her knee for pain control  -will need nursing home placement/AZUCENA

## 2017-08-15 NOTE — PROGRESS NOTE ADULT - PROBLEM SELECTOR PLAN 5
-contracted and has a lot of pain when moving  -cont. celecoxib, gabapentin and lidocaine patch an tylenol on her knee for pain control  -will need nursing home placement/AZUCENA
-contracted and has a lot of pain when moving  -cont. celecoxib, gabapentin and lidocaine patch an tylenol on her knee for pain control  -will need nursing home placement/AZUCENA
potassium supplemented, f/u BMP
potassium supplemented, f/u BMP

## 2017-08-15 NOTE — PROGRESS NOTE ADULT - SUBJECTIVE AND OBJECTIVE BOX
Internal Medicine Hospitalist - Dr. Brittany GILBERT    [id number]    89y      Female    Patient is a 89y old  Female who presents with a chief complaint of BIBA for weakness (11 Aug 2017 11:12)    INTERVAL HPI/ OVERNIGHT EVENTS: Patient is seen and examined, pt had lunch yesterday, tend to sleep in morning, requiring assistance and encouragement regarding feeding       PHYSICAL EXAM:    Vital Signs Last 24 Hrs  T(C): 37.1 (15 Aug 2017 01:15), Max: 37.1 (15 Aug 2017 01:15)  T(F): 98.7 (15 Aug 2017 01:15), Max: 98.7 (15 Aug 2017 01:15)  HR: 70 (15 Aug 2017 01:15) (68 - 77)  BP: 154/71 (15 Aug 2017 04:50) (152/74 - 181/72)  BP(mean): --  RR: 17 (15 Aug 2017 01:15) (17 - 18)  SpO2: 94% (15 Aug 2017 01:15) (94% - 94%)    GENERAL: NAD  HEENT: EOMI  Neck: supple  CHEST/LUNG: positive air entry   HEART: S1S2+ audible  ABDOMEN: Soft, Nontender, Nondistended; Bowel sounds present  EXTREMITIES:  no edema  CNS: Awake     LABS:    08-14    146<H>  |  105  |  7.0<L>  ----------------------------<  123<H>  3.0<L>   |  26.0  |  0.49<L>    Ca    9.4      14 Aug 2017 07:32  Phos  2.5     08-14  Mg     1.8     08-14              MEDICATIONS  (STANDING):  enoxaparin Injectable 30 milliGRAM(s) SubCutaneous every 24 hours  celecoxib 200 milliGRAM(s) Oral daily  lidocaine   Patch 1 Patch Transdermal daily  metoprolol 50 milliGRAM(s) Oral every 8 hours  amLODIPine   Tablet 10 milliGRAM(s) Oral daily  pantoprazole    Tablet 40 milliGRAM(s) Oral before breakfast  mirtazapine 15 milliGRAM(s) Oral at bedtime  dronabinol 5 milliGRAM(s) Oral two times a day  lisinopril 20 milliGRAM(s) Oral daily  gabapentin 200 milliGRAM(s) Oral every 8 hours  potassium acid phosphate/sodium acid phosphate tablet (K-PHOS No. 2) 1 Tablet(s) Oral four times a day with meals  sodium chloride 0.45% with potassium chloride 20 mEq/L 1000 milliLiter(s) (75 mL/Hr) IV Continuous <Continuous>    MEDICATIONS  (PRN):  hydrALAZINE Injectable 10 milliGRAM(s) IV Push every 3 hours PRN SBP >160  acetaminophen   Tablet. 650 milliGRAM(s) Oral every 6 hours PRN Mild Pain (1 - 3)      RADIOLOGY & ADDITIONAL TEST

## 2017-08-15 NOTE — PROGRESS NOTE ADULT - PROBLEM SELECTOR PROBLEM 2
Failure to thrive in adult
R/O Hypokalemia
R/O Hypokalemia
Failure to thrive in adult
Failure to thrive in adult
Weakness

## 2017-08-15 NOTE — PROGRESS NOTE ADULT - PROBLEM SELECTOR PROBLEM 3
Arthritis
Essential hypertension
R/O Hypernatremia
R/O Hypernatremia
Essential hypertension
Failure to thrive in adult

## 2017-08-15 NOTE — PROGRESS NOTE ADULT - PROBLEM SELECTOR PLAN 4
- Bp is stable   -cont norvasc/metoprolol, lisinopril to 20mg Qdaily today, can increase further if BPs not under control  -d/c on toprol 150mg Qdaily  -cont norvasc 10mg Qdaily
- Bp is improving   -cont norvasc/metoprolol, lisinopril to 20mg Qdaily today, can increase further if BPs not under control  -d/c on toprol 150mg Qdaily  -cont norvasc 10mg Qdaily
-cont norvasc/metoprolol  -increased lisinopril to 20mg Qdaily today, can increase further if BPs not under control  -d/c on toprol 150mg Qdaily  -cont norvasc 10mg Qdaily
-cont norvasc/metoprolol/ACE-I
-d/cd normal saline as she was becoming hypernatremic  -ordered D5LR at 100cc/hr  -f/u urine culture
-she's now able to tolerate pureed diet  -she's a DNR/DNI, confirmed with phone call from her daughter Cristina (number is in the provider handoff and d/c note)  -she's eating with the addition of robinul and remeron
-she's now able to tolerate pureed diet  -she's a DNR/DNI, confirmed with phone call from her daughter Cristina (number is in the provider handoff and d/c note)  -she's eating with the addition of robinul and remeron
-d/cd normal saline as she was becoming hypernatremic  -ordered D5LR at 100cc/hr  -f/u urine culture
-cont norvasc/metoprolol/ACE-I

## 2017-08-16 VITALS
SYSTOLIC BLOOD PRESSURE: 101 MMHG | OXYGEN SATURATION: 97 % | RESPIRATION RATE: 18 BRPM | TEMPERATURE: 98 F | DIASTOLIC BLOOD PRESSURE: 65 MMHG | HEART RATE: 70 BPM

## 2017-08-16 PROCEDURE — 71045 X-RAY EXAM CHEST 1 VIEW: CPT

## 2017-08-16 PROCEDURE — 96374 THER/PROPH/DIAG INJ IV PUSH: CPT

## 2017-08-16 PROCEDURE — 85027 COMPLETE CBC AUTOMATED: CPT

## 2017-08-16 PROCEDURE — 92526 ORAL FUNCTION THERAPY: CPT

## 2017-08-16 PROCEDURE — 83735 ASSAY OF MAGNESIUM: CPT

## 2017-08-16 PROCEDURE — 97167 OT EVAL HIGH COMPLEX 60 MIN: CPT

## 2017-08-16 PROCEDURE — 99285 EMERGENCY DEPT VISIT HI MDM: CPT | Mod: 25

## 2017-08-16 PROCEDURE — 82550 ASSAY OF CK (CPK): CPT

## 2017-08-16 PROCEDURE — 84443 ASSAY THYROID STIM HORMONE: CPT

## 2017-08-16 PROCEDURE — 36415 COLL VENOUS BLD VENIPUNCTURE: CPT

## 2017-08-16 PROCEDURE — 82553 CREATINE MB FRACTION: CPT

## 2017-08-16 PROCEDURE — 97535 SELF CARE MNGMENT TRAINING: CPT

## 2017-08-16 PROCEDURE — 84100 ASSAY OF PHOSPHORUS: CPT

## 2017-08-16 PROCEDURE — 85730 THROMBOPLASTIN TIME PARTIAL: CPT

## 2017-08-16 PROCEDURE — 80048 BASIC METABOLIC PNL TOTAL CA: CPT

## 2017-08-16 PROCEDURE — 92610 EVALUATE SWALLOWING FUNCTION: CPT

## 2017-08-16 PROCEDURE — 84484 ASSAY OF TROPONIN QUANT: CPT

## 2017-08-16 PROCEDURE — 99239 HOSP IP/OBS DSCHRG MGMT >30: CPT

## 2017-08-16 PROCEDURE — 97530 THERAPEUTIC ACTIVITIES: CPT

## 2017-08-16 PROCEDURE — 80053 COMPREHEN METABOLIC PANEL: CPT

## 2017-08-16 PROCEDURE — 70450 CT HEAD/BRAIN W/O DYE: CPT

## 2017-08-16 PROCEDURE — 85610 PROTHROMBIN TIME: CPT

## 2017-08-16 PROCEDURE — 96375 TX/PRO/DX INJ NEW DRUG ADDON: CPT

## 2017-08-16 PROCEDURE — 81001 URINALYSIS AUTO W/SCOPE: CPT

## 2017-08-16 PROCEDURE — 97163 PT EVAL HIGH COMPLEX 45 MIN: CPT

## 2017-08-16 RX ORDER — METOPROLOL TARTRATE 50 MG
1 TABLET ORAL
Qty: 30 | Refills: 0 | OUTPATIENT
Start: 2017-08-16 | End: 2017-09-15

## 2017-08-16 RX ADMIN — PANTOPRAZOLE SODIUM 40 MILLIGRAM(S): 20 TABLET, DELAYED RELEASE ORAL at 06:25

## 2017-08-16 RX ADMIN — Medication 5 MILLIGRAM(S): at 06:25

## 2017-08-16 RX ADMIN — CELECOXIB 200 MILLIGRAM(S): 200 CAPSULE ORAL at 12:11

## 2017-08-16 RX ADMIN — LIDOCAINE 1 PATCH: 4 CREAM TOPICAL at 12:11

## 2017-08-16 RX ADMIN — ENOXAPARIN SODIUM 30 MILLIGRAM(S): 100 INJECTION SUBCUTANEOUS at 12:11

## 2017-08-16 RX ADMIN — LIDOCAINE 1 PATCH: 4 CREAM TOPICAL at 01:27

## 2017-12-26 ENCOUNTER — APPOINTMENT (OUTPATIENT)
Dept: VASCULAR SURGERY | Facility: CLINIC | Age: 82
End: 2017-12-26
Payer: MEDICARE

## 2017-12-26 VITALS
DIASTOLIC BLOOD PRESSURE: 62 MMHG | RESPIRATION RATE: 16 BRPM | OXYGEN SATURATION: 99 % | SYSTOLIC BLOOD PRESSURE: 166 MMHG | HEART RATE: 93 BPM

## 2017-12-26 DIAGNOSIS — Z86.59 PERSONAL HISTORY OF OTHER MENTAL AND BEHAVIORAL DISORDERS: ICD-10-CM

## 2017-12-26 DIAGNOSIS — I96 GANGRENE, NOT ELSEWHERE CLASSIFIED: ICD-10-CM

## 2017-12-26 PROCEDURE — 99204 OFFICE O/P NEW MOD 45 MIN: CPT

## 2019-01-16 ENCOUNTER — APPOINTMENT (OUTPATIENT)
Dept: INTERNAL MEDICINE | Facility: CLINIC | Age: 84
End: 2019-01-16

## 2022-08-04 NOTE — ED ADULT NURSE NOTE - NO PERTINENT FAMILY HISTORY
-- DO NOT REPLY / DO NOT REPLY ALL --  -- Message is from Engagement Center Operations ([facility]) --    General Patient Message      Nurse Case Coordinator is requesting a current list of all the patient's medications via fax at [phone].    Caller Information       Type Contact Phone/Fax    08/04/2022 03:54 PM CDT Phone (Incoming) Isidro  (Nurse) [phone]     Extension [phone]        Alternative phone number: None    Can a detailed message be left? Yes    Message Turnaround:     Did the caller agree that this message can wait until the office reopens in the morning? YES - The Message Can Wait - Send a message to the provider's clinical support pool     [place]:    Please give this turnaround time to the caller:   \"This message will be sent to [state Provider's name]. The clinical team will fulfill your request as soon as they review your message.\"                
[facility] faxed  
<<----- Click to add NO pertinent Family History

## 2024-11-15 NOTE — ED PROVIDER NOTE - CARDIOVASCULAR NEGATIVE STATEMENT, MLM
Addended by: ANTELMO HILL on: 11/15/2024 09:05 AM     Modules accepted: Orders     normal rate and rhythm, no chest pain and no edema.

## 2025-06-22 NOTE — PATIENT PROFILE ADULT. - NS PRO ABUSE SCREEN AFRAID ANYONE YN
pelvic pain and dysuria x 1 month. no new symptoms noted. a and o x3. [place] (doesn't have hearing aids).
unable to assess